# Patient Record
Sex: FEMALE | Race: WHITE | Employment: FULL TIME | URBAN - METROPOLITAN AREA
[De-identification: names, ages, dates, MRNs, and addresses within clinical notes are randomized per-mention and may not be internally consistent; named-entity substitution may affect disease eponyms.]

---

## 2017-03-01 ENCOUNTER — COMMUNICATION - HEALTHEAST (OUTPATIENT)
Dept: INTERNAL MEDICINE | Facility: CLINIC | Age: 32
End: 2017-03-01

## 2017-04-24 ENCOUNTER — OFFICE VISIT - HEALTHEAST (OUTPATIENT)
Dept: INTERNAL MEDICINE | Facility: CLINIC | Age: 32
End: 2017-04-24

## 2017-04-24 DIAGNOSIS — N75.0 INFECTED CYST OF BARTHOLIN'S GLAND DUCT: ICD-10-CM

## 2017-04-24 DIAGNOSIS — D64.9 ANEMIA, UNSPECIFIED: ICD-10-CM

## 2017-04-24 DIAGNOSIS — E55.9 VITAMIN D DEFICIENCY: ICD-10-CM

## 2017-04-24 DIAGNOSIS — Z00.00 HEALTH CARE MAINTENANCE: ICD-10-CM

## 2017-04-24 LAB
CHOLEST SERPL-MCNC: 133 MG/DL
FASTING STATUS PATIENT QL REPORTED: YES
HDLC SERPL-MCNC: 41 MG/DL
LDLC SERPL CALC-MCNC: 78 MG/DL
TRIGL SERPL-MCNC: 70 MG/DL

## 2017-04-24 ASSESSMENT — MIFFLIN-ST. JEOR: SCORE: 1208.65

## 2017-04-27 ENCOUNTER — COMMUNICATION - HEALTHEAST (OUTPATIENT)
Dept: INTERNAL MEDICINE | Facility: CLINIC | Age: 32
End: 2017-04-27

## 2017-04-28 LAB
HPV INTERPRETATION - HISTORICAL: ABNORMAL
HPV INTERPRETER - HISTORICAL: ABNORMAL

## 2017-05-01 ENCOUNTER — COMMUNICATION - HEALTHEAST (OUTPATIENT)
Dept: INTERNAL MEDICINE | Facility: CLINIC | Age: 32
End: 2017-05-01

## 2017-05-01 DIAGNOSIS — R87.610 ATYPICAL SQUAMOUS CELLS OF UNDETERMINED SIGNIFICANCE ON CYTOLOGIC SMEAR OF CERVIX (ASC-US): ICD-10-CM

## 2017-05-01 LAB
BKR LAB AP ABNORMAL BLEEDING: NO
BKR LAB AP BIRTH CONTROL/HORMONES: ABNORMAL
BKR LAB AP CERVICAL APPEARANCE: NORMAL
BKR LAB AP GYN ADEQUACY: ABNORMAL
BKR LAB AP GYN INTERPRETATION: ABNORMAL
BKR LAB AP HPV REFLEX: ABNORMAL
BKR LAB AP LMP: ABNORMAL
BKR LAB AP PATIENT STATUS: ABNORMAL
BKR LAB AP PREVIOUS ABNORMAL: NO
BKR LAB AP PREVIOUS NORMAL: ABNORMAL
HIGH RISK?: NO
PATH REPORT.COMMENTS IMP SPEC: ABNORMAL
RESULT FLAG (HE HISTORICAL CONVERSION): ABNORMAL

## 2017-05-04 ENCOUNTER — RECORDS - HEALTHEAST (OUTPATIENT)
Dept: ADMINISTRATIVE | Facility: OTHER | Age: 32
End: 2017-05-04

## 2018-01-19 ENCOUNTER — COMMUNICATION - HEALTHEAST (OUTPATIENT)
Dept: INTERNAL MEDICINE | Facility: CLINIC | Age: 33
End: 2018-01-19

## 2018-01-19 DIAGNOSIS — F32.A DEPRESSION: ICD-10-CM

## 2020-04-28 ENCOUNTER — COMMUNICATION - HEALTHEAST (OUTPATIENT)
Dept: SCHEDULING | Facility: CLINIC | Age: 35
End: 2020-04-28

## 2021-01-04 ENCOUNTER — OFFICE VISIT - HEALTHEAST (OUTPATIENT)
Dept: FAMILY MEDICINE | Facility: CLINIC | Age: 36
End: 2021-01-04

## 2021-01-04 ENCOUNTER — COMMUNICATION - HEALTHEAST (OUTPATIENT)
Dept: SCHEDULING | Facility: CLINIC | Age: 36
End: 2021-01-04

## 2021-01-04 DIAGNOSIS — N90.7 SEBACEOUS CYST OF LABIA: ICD-10-CM

## 2021-01-04 RX ORDER — FLUOCINONIDE TOPICAL SOLUTION USP, 0.05% 0.5 MG/ML
1 SOLUTION TOPICAL PRN
Status: SHIPPED | COMMUNITY
Start: 2020-12-01

## 2021-01-04 RX ORDER — KETOCONAZOLE 20 MG/ML
1 SHAMPOO TOPICAL PRN
Status: SHIPPED | COMMUNITY
Start: 2020-12-29

## 2021-01-04 ASSESSMENT — MIFFLIN-ST. JEOR: SCORE: 1246.3

## 2021-01-04 NOTE — ASSESSMENT & PLAN NOTE
Already draining, with recurrence though will start on antibiotics as per orders.  If it does recur within the next 3 months then consider 6 to 12-month therapy of doxycycline daily.

## 2021-01-06 ENCOUNTER — RECORDS - HEALTHEAST (OUTPATIENT)
Dept: ADMINISTRATIVE | Facility: OTHER | Age: 36
End: 2021-01-06

## 2021-01-06 ENCOUNTER — COMMUNICATION - HEALTHEAST (OUTPATIENT)
Dept: ADMINISTRATIVE | Facility: CLINIC | Age: 36
End: 2021-01-06

## 2021-01-06 DIAGNOSIS — N90.7 SEBACEOUS CYST OF LABIA: ICD-10-CM

## 2021-03-03 ENCOUNTER — ANESTHESIA - HEALTHEAST (OUTPATIENT)
Dept: SURGERY | Facility: AMBULATORY SURGERY CENTER | Age: 36
End: 2021-03-03

## 2021-03-03 ASSESSMENT — MIFFLIN-ST. JEOR: SCORE: 1257.42

## 2021-03-04 ENCOUNTER — SURGERY - HEALTHEAST (OUTPATIENT)
Dept: SURGERY | Facility: AMBULATORY SURGERY CENTER | Age: 36
End: 2021-03-04

## 2021-03-04 ASSESSMENT — MIFFLIN-ST. JEOR: SCORE: 1257.42

## 2021-04-14 ENCOUNTER — OFFICE VISIT (OUTPATIENT)
Dept: NURSING | Facility: CLINIC | Age: 36
End: 2021-04-14
Payer: COMMERCIAL

## 2021-04-14 PROCEDURE — 0001A PR COVID VAC PFIZER DIL RECON 30 MCG/0.3 ML IM: CPT

## 2021-04-14 PROCEDURE — 91300 PR COVID VAC PFIZER DIL RECON 30 MCG/0.3 ML IM: CPT

## 2021-05-01 ENCOUNTER — HEALTH MAINTENANCE LETTER (OUTPATIENT)
Age: 36
End: 2021-05-01

## 2021-05-05 ENCOUNTER — IMMUNIZATION (OUTPATIENT)
Dept: NURSING | Facility: CLINIC | Age: 36
End: 2021-05-05
Attending: INTERNAL MEDICINE
Payer: COMMERCIAL

## 2021-05-05 PROCEDURE — 0002A PR COVID VAC PFIZER DIL RECON 30 MCG/0.3 ML IM: CPT

## 2021-05-05 PROCEDURE — 91300 PR COVID VAC PFIZER DIL RECON 30 MCG/0.3 ML IM: CPT

## 2021-05-30 VITALS — HEIGHT: 61 IN | BODY MASS INDEX: 24.73 KG/M2 | WEIGHT: 131 LBS

## 2021-06-05 VITALS
WEIGHT: 139.3 LBS | HEIGHT: 61 IN | TEMPERATURE: 99.4 F | SYSTOLIC BLOOD PRESSURE: 124 MMHG | RESPIRATION RATE: 12 BRPM | HEART RATE: 76 BPM | DIASTOLIC BLOOD PRESSURE: 64 MMHG | BODY MASS INDEX: 26.3 KG/M2

## 2021-06-05 VITALS — WEIGHT: 140 LBS | BODY MASS INDEX: 26.43 KG/M2 | HEIGHT: 61 IN

## 2021-06-07 NOTE — TELEPHONE ENCOUNTER
"\"Feels like I have chest tightness\"    Left side chest tightness she reports for 4 weeks.    Left arm pain., at times. But I do work out  I dont feel stressed out., but when I do feel stressed It comes.    No shortness of breath.  No cough.  No fever   Patient advised to go to the ER . She states she will go to [facility] today. Closer to her home.    States not exposed to COVID 19. Or any known cases  Annel Bennett RN  [facility] Triage/refill nurse        Reason for Disposition    Chest pain lasting longer than 5 minutes    Intermittent chest pains persist > 3 days    Patient wants to be seen    Intermittent mild chest pain lasting a few seconds each time    Protocols used: CHEST PAIN-A-OH      "

## 2021-06-10 NOTE — PROGRESS NOTES
"Chief complaint: Here for a physical    History of present illness:   Francisca comes in today originally for a swelling in the inner labia.  She also would like to change to our clinic as she lives and works closer here.  She noticed the swelling about a week ago and it got worse and 2 days ago was quite severe, today is feeling a little better.  He denies any other concerns.    Social history:   Social History     Social History Narrative    She is a  and works in [employer].  She is , does not have children, and lives with her parents.  She enjoys biking and reading.       Family history:    Family History   Problem Relation Age of Onset     Other Father      encephalitis     Dementia Father        Review of systems: See above.  The rest of the review of systems are negative for all systems.    Current allergies, medications, and problem list are all reviewed and updated in the chart.    Physical exam:  Vitals:    04/24/17 0817   BP: 98/62   Pulse: (!) 55   Weight: 131 lb (59.4 kg)   Height: 5' 0.5\" (1.537 m)     Body mass index is 25.16 kg/(m^2).  General Appearance:  Alert, cooperative, no distress, appears stated age   Head:  Normocephalic, without obvious abnormality, atraumatic   Eyes:  PERRL, conjunctiva/corneas clear, EOM's intact   Ears:  Normal TM's and external ear canals, both ears   Nose: Nares normal, no drainage    Throat: Lips, mucosa, and tongue normal; teeth and gums normal   Neck: Supple, symmetrical, trachea midline, no adenopathy;  thyroid: not enlarged, symmetric, no tenderness/mass/nodules; no carotid bruit   Back:   Symmetric, no curvature, ROM normal   Lungs:   Clear to auscultation bilaterally, respirations unlabored   Breasts:  No breast masses, tenderness, asymmetry, or nipple discharge.   Heart:  Regular rate and rhythm, S1 and S2 normal, no murmur, rub, or gallop   Abdomen:   Soft, non-tender, bowel sounds active, no masses, no organomegaly   Genitalia: Normally " developed genitalia with no external lesions or eruptions.  Vagina and cervix show swelling at the left lower introitus with tenderness and induration, no cystocele.  Uterus normal size and position.  No adnexal mass or tenderness.   Rectal:  No hemorrhoids   Extremities: Extremities normal, atraumatic, no cyanosis or edema   Skin: Skin color, texture, turgor normal, no rashes or lesions   Lymph nodes: Cervical, supraclavicular, and axillary nodes normal   Neurologic: Nonfocal with facial symmetry      Assessment and plan:  1. Health care maintenance  She had an abnormal Pap smear 3 years ago and did not have follow-up.  If this 1 is abnormal with positive HPV we will send her on for a colposcopy.  - Gynecologic Cytology (PAP Smear)  - Lipid Cascade    2. Anemia  Check labs, and the past was related to menstrual cycles and she now has a Mirena IUD.  - Basic Metabolic Panel  - HM2(CBC w/o Differential)  - Thyroid Stimulating Hormone (TSH)    3. Vitamin D deficiency  Is not currently taking supplements.  - Vitamin D, Total (25-Hydroxy)    4. Infected cyst of Bartholin's gland duct  We will start her on Septra twice daily for 7 days and she will take a bath daily for 30 minutes.  If her symptoms are not improving or resolved within the next 2-3 days would recommend she have this I&D by OB/GYN.          Paulette Real MD  Internal and Geriatric Medicine  [facility]  4/24/2017    [facility]  Much or all of the text in this note was generated through the use of Dragon Dictate voice-to-text software. Errors in spelling or words which seem out of context are unintentional.  Sound alike errors, in particular, may have escaped editing.

## 2021-06-14 NOTE — TELEPHONE ENCOUNTER
Reason for call:  Patient reporting a symptom of pain    Symptom or request: pain in groin    Duration (how long have symptoms been present): pt was seen on 1/4/21 for pain and was put on medication. Pt did have some of the side effects that Dr. Norton discussed with her but the pain has not decreased at all.    Have you been treated for this before? Yes    Additional comments: Patient is very uncomfortable with the pain in the groin area. She is taking the medication like she was instructed but the pain is not decreasing at all.    Phone Number patient can be reached at:    Cell number on file:    Telephone Information:   Mobile [phone]       Best Time:  anytime    Can we leave a detailed message on this number: Yes    Call taken on 1/6/2021 at 8:42 AM by Meron Jensen
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
## 2021-06-14 NOTE — TELEPHONE ENCOUNTER
Given pain and expected improvement. Discussed taking tylenol and advil and will get urgent Gyn consult as it most likely is going to require draining.

## 2021-06-15 NOTE — ANESTHESIA CARE TRANSFER NOTE
Last vitals:   Vitals:    03/04/21 0835   BP: (P) 96/52   Pulse: (P) 91   Resp: (P) 16   Temp: (P) 36.1  C (97  F)   SpO2: (P) 99%     Pt brought to phase 2 on 6L O2. Monitors applied. VSS.    Patient's level of consciousness is drowsy  Spontaneous respirations: yes  Maintains airway independently: yes  Dentition unchanged: yes  Oropharynx: oropharynx clear of all foreign objects    QCDR Measures:  ASA# 20 - Surgical Safety Checklist: WHO surgical safety checklist completed prior to induction    PQRS# 430 - Adult PONV Prevention: 4558F - Pt received => 2 anti-emetic agents (different classes) preop & intraop  ASA# 8 - Peds PONV Prevention: NA - Not pediatric patient, not GA or 2 or more risk factors NOT present  PQRS# 424 - Elizabeth-op Temp Management: 4559F - At least one body temp DOCUMENTED => 35.5C or 95.9F within required timeframe  PQRS# 426 - PACU Transfer Protocol: - Transfer of care checklist used  ASA# 14 - Acute Post-op Pain: ASA14B - Patient did NOT experience pain >= 7 out of 10

## 2021-06-15 NOTE — ANESTHESIA PREPROCEDURE EVALUATION
Anesthesia Evaluation      No history of anesthetic complications     Airway   Mallampati: I  Neck ROM: full   Pulmonary     breath sounds clear to auscultation  (-) asthma                         Cardiovascular   Exercise tolerance: > or = 10 METS  (-) angina  Rhythm: regular  Rate: normal,         Neuro/Psych    (-) no seizures    Endo/Other    (-) no diabetes     GI/Hepatic/Renal    (-) renal disease          Dental                         Anesthesia Plan  Planned anesthetic: MAC    ASA 1     Anesthetic plan and risks discussed with: patient  Anesthesia plan special considerations: antiemetics,   Post-op plan: routine recovery

## 2021-06-15 NOTE — ANESTHESIA POSTPROCEDURE EVALUATION
Patient: Francisca Rojo  Procedure(s):  EXCISION LEFT BARTHOLIN CYST  Anesthesia type: MAC    Patient location: Phase II Recovery  Last vitals:   Vitals Value Taken Time   /72 03/04/21 1000   Temp 36.1  C (97  F) 03/04/21 0835   Pulse 60 03/04/21 1002   Resp 16 03/04/21 0843   SpO2 99 % 03/04/21 1002   Vitals shown include unvalidated device data.  Post vital signs: stable  Level of consciousness: awake and responds to simple questions  Post-anesthesia pain: pain controlled  Post-anesthesia nausea and vomiting: no  Pulmonary: unassisted, return to baseline  Cardiovascular: stable and blood pressure at baseline  Hydration: adequate  Anesthetic events: no    QCDR Measures:  ASA# 11 - Elizabeth-op Cardiac Arrest: ASA11B - Patient did NOT experience unanticipated cardiac arrest  ASA# 12 - Elizabeth-op Mortality Rate: ASA12B - Patient did NOT die  ASA# 13 - PACU Re-Intubation Rate: NA - No ETT / LMA used for case  ASA# 10 - Composite Anes Safety: ASA10A - No serious adverse event    Additional Notes:

## 2021-06-16 PROBLEM — N90.7 SEBACEOUS CYST OF LABIA: Status: ACTIVE | Noted: 2021-01-04

## 2021-06-30 NOTE — PROGRESS NOTES
"Progress Notes by Jayy Norton DO at 1/4/2021  3:20 PM     Author: Jayy Norton DO Service: -- Author Type: Physician    Filed: 1/4/2021  6:54 PM Encounter Date: 1/4/2021 Status: Signed    : Jayy Norton DO (Physician)         Assessment & Plan   Problem List Items Addressed This Visit     Sebaceous cyst of labia     Already draining, with recurrence though will start on antibiotics as per orders.  If it does recur within the next 3 months then consider 6 to 12-month therapy of doxycycline daily.         Relevant Medications    fluocinonide (LIDEX) 0.05 % external solution    doxycycline (VIBRA-TABS) 100 MG tablet             BMI:   Estimated body mass index is 26.76 kg/m  as calculated from the following:    Height as of this encounter: 5' 0.5\" (1.537 m).    Weight as of this encounter: 139 lb 4.8 oz (63.2 kg).   The following high BMI interventions were performed this visit: encouragement to exercise        Return in about 6 months (around 7/4/2021) for Annual physical.    Jayy Norton DO  [facility]     Francisca Rojo is 35 y.o. and presents to clinic today for the following health issues   35 y.o. female presents for pain around left labia with possible cyst.  Patient had similar quite a few years ago.  Started developing about a week ago but previously they drained on their own.  This 1 that was not draining and now causing excessive discomfort.  Sitting makes worse than any pressure around her labia makes worse.  No vaginal discharge.  No change in undergarments, or any recent procedures.  She just completed a full series of electrolysis for hair removal approximately 4 to 6 months ago.  She has not used any creams or shave the area.  In October she did have a Pap smear and also replacement of her IUD.       Review of Systems   Constitutional: Negative for chills, fatigue and fever.   Genitourinary: " "Negative for dysuria, frequency, menstrual problem, pelvic pain, urgency and vaginal discharge.           Objective    /64 (Patient Site: Left Arm, Patient Position: Sitting, Cuff Size: Adult Large)   Pulse 76   Temp 99.4  F (37.4  C) (Oral)   Resp 12   Ht 5' 0.5\" (1.537 m)   Wt 139 lb 4.8 oz (63.2 kg)   LMP  (LMP Unknown)   Breastfeeding No   BMI 26.76 kg/m    Body mass index is 26.76 kg/m .  Physical Exam   Constitutional: She is oriented to person, place, and time. She appears well-developed and well-nourished. No distress.   HENT:   Head: Normocephalic and atraumatic.   Pulmonary/Chest: Effort normal and breath sounds normal.   Genitourinary:       There is no rash, tenderness or lesion on the right labia. There is tenderness and lesion on the left labia. There is no rash on the left labia.    No vaginal discharge.     Musculoskeletal:         General: No edema.   Neurological: She is alert and oriented to person, place, and time.   Psychiatric: She has a normal mood and affect. Her behavior is normal.                    "

## 2021-07-07 ENCOUNTER — TRANSFERRED RECORDS (OUTPATIENT)
Dept: MULTI SPECIALTY CLINIC | Facility: CLINIC | Age: 36
End: 2021-07-07

## 2021-07-07 LAB
PAP SMEAR - HIM PATIENT REPORTED: NEGATIVE
PAP-ABSTRACT: NORMAL

## 2021-09-29 ENCOUNTER — TRANSFERRED RECORDS (OUTPATIENT)
Dept: HEALTH INFORMATION MANAGEMENT | Facility: CLINIC | Age: 36
End: 2021-09-29

## 2021-10-16 ENCOUNTER — HEALTH MAINTENANCE LETTER (OUTPATIENT)
Age: 36
End: 2021-10-16

## 2022-05-22 ENCOUNTER — HEALTH MAINTENANCE LETTER (OUTPATIENT)
Age: 37
End: 2022-05-22

## 2022-09-25 ENCOUNTER — HEALTH MAINTENANCE LETTER (OUTPATIENT)
Age: 37
End: 2022-09-25

## 2023-01-05 ENCOUNTER — LAB REQUISITION (OUTPATIENT)
Dept: LAB | Facility: CLINIC | Age: 38
End: 2023-01-05
Payer: COMMERCIAL

## 2023-01-05 DIAGNOSIS — N89.8 OTHER SPECIFIED NONINFLAMMATORY DISORDERS OF VAGINA: ICD-10-CM

## 2023-01-05 PROCEDURE — 87491 CHLMYD TRACH DNA AMP PROBE: CPT | Mod: ORL | Performed by: OBSTETRICS & GYNECOLOGY

## 2023-01-06 LAB
C TRACH DNA SPEC QL PROBE+SIG AMP: NEGATIVE
N GONORRHOEA DNA SPEC QL NAA+PROBE: NEGATIVE

## 2023-03-29 ENCOUNTER — OFFICE VISIT (OUTPATIENT)
Dept: FAMILY MEDICINE | Facility: CLINIC | Age: 38
End: 2023-03-29
Payer: COMMERCIAL

## 2023-03-29 VITALS
DIASTOLIC BLOOD PRESSURE: 69 MMHG | HEART RATE: 70 BPM | OXYGEN SATURATION: 98 % | RESPIRATION RATE: 16 BRPM | TEMPERATURE: 97.8 F | WEIGHT: 123.3 LBS | HEIGHT: 61 IN | BODY MASS INDEX: 23.28 KG/M2 | SYSTOLIC BLOOD PRESSURE: 100 MMHG

## 2023-03-29 DIAGNOSIS — Z83.3 FAMILY HISTORY OF DIABETES MELLITUS: ICD-10-CM

## 2023-03-29 DIAGNOSIS — Z00.00 ANNUAL PHYSICAL EXAM: Primary | ICD-10-CM

## 2023-03-29 DIAGNOSIS — E55.9 VITAMIN D DEFICIENCY: ICD-10-CM

## 2023-03-29 DIAGNOSIS — F32.A DEPRESSION, UNSPECIFIED DEPRESSION TYPE: ICD-10-CM

## 2023-03-29 LAB
ALBUMIN SERPL BCG-MCNC: 4.1 G/DL (ref 3.5–5.2)
ALP SERPL-CCNC: 79 U/L (ref 35–104)
ALT SERPL W P-5'-P-CCNC: 21 U/L (ref 10–35)
ANION GAP SERPL CALCULATED.3IONS-SCNC: 11 MMOL/L (ref 7–15)
AST SERPL W P-5'-P-CCNC: 19 U/L (ref 10–35)
BASOPHILS # BLD AUTO: 0 10E3/UL (ref 0–0.2)
BASOPHILS NFR BLD AUTO: 0 %
BILIRUB SERPL-MCNC: 0.4 MG/DL
BUN SERPL-MCNC: 9.4 MG/DL (ref 6–20)
CALCIUM SERPL-MCNC: 8.9 MG/DL (ref 8.6–10)
CHLORIDE SERPL-SCNC: 105 MMOL/L (ref 98–107)
CHOLEST SERPL-MCNC: 140 MG/DL
CREAT SERPL-MCNC: 0.64 MG/DL (ref 0.51–0.95)
DEPRECATED HCO3 PLAS-SCNC: 25 MMOL/L (ref 22–29)
EOSINOPHIL # BLD AUTO: 0 10E3/UL (ref 0–0.7)
EOSINOPHIL NFR BLD AUTO: 0 %
ERYTHROCYTE [DISTWIDTH] IN BLOOD BY AUTOMATED COUNT: 12.8 % (ref 10–15)
GFR SERPL CREATININE-BSD FRML MDRD: >90 ML/MIN/1.73M2
GLUCOSE SERPL-MCNC: 88 MG/DL (ref 70–99)
HBA1C MFR BLD: 5.4 % (ref 0–5.6)
HCT VFR BLD AUTO: 44.8 % (ref 35–47)
HDLC SERPL-MCNC: 52 MG/DL
HGB BLD-MCNC: 14.7 G/DL (ref 11.7–15.7)
IMM GRANULOCYTES # BLD: 0 10E3/UL
IMM GRANULOCYTES NFR BLD: 0 %
LDLC SERPL CALC-MCNC: 76 MG/DL
LYMPHOCYTES # BLD AUTO: 1.7 10E3/UL (ref 0.8–5.3)
LYMPHOCYTES NFR BLD AUTO: 20 %
MCH RBC QN AUTO: 31.9 PG (ref 26.5–33)
MCHC RBC AUTO-ENTMCNC: 32.8 G/DL (ref 31.5–36.5)
MCV RBC AUTO: 97 FL (ref 78–100)
MONOCYTES # BLD AUTO: 0.6 10E3/UL (ref 0–1.3)
MONOCYTES NFR BLD AUTO: 7 %
NEUTROPHILS # BLD AUTO: 6.2 10E3/UL (ref 1.6–8.3)
NEUTROPHILS NFR BLD AUTO: 73 %
NONHDLC SERPL-MCNC: 88 MG/DL
PLATELET # BLD AUTO: 212 10E3/UL (ref 150–450)
POTASSIUM SERPL-SCNC: 3.8 MMOL/L (ref 3.4–5.3)
PROT SERPL-MCNC: 7.4 G/DL (ref 6.4–8.3)
RBC # BLD AUTO: 4.61 10E6/UL (ref 3.8–5.2)
SODIUM SERPL-SCNC: 141 MMOL/L (ref 136–145)
TRIGL SERPL-MCNC: 58 MG/DL
TSH SERPL DL<=0.005 MIU/L-ACNC: 1.17 UIU/ML (ref 0.3–4.2)
WBC # BLD AUTO: 8.5 10E3/UL (ref 4–11)

## 2023-03-29 PROCEDURE — 82306 VITAMIN D 25 HYDROXY: CPT | Performed by: PHYSICIAN ASSISTANT

## 2023-03-29 PROCEDURE — 80061 LIPID PANEL: CPT | Performed by: PHYSICIAN ASSISTANT

## 2023-03-29 PROCEDURE — 80050 GENERAL HEALTH PANEL: CPT | Performed by: PHYSICIAN ASSISTANT

## 2023-03-29 PROCEDURE — 83036 HEMOGLOBIN GLYCOSYLATED A1C: CPT | Performed by: PHYSICIAN ASSISTANT

## 2023-03-29 PROCEDURE — 36415 COLL VENOUS BLD VENIPUNCTURE: CPT | Performed by: PHYSICIAN ASSISTANT

## 2023-03-29 PROCEDURE — 99385 PREV VISIT NEW AGE 18-39: CPT | Performed by: PHYSICIAN ASSISTANT

## 2023-03-29 RX ORDER — ESCITALOPRAM OXALATE 10 MG/1
10 TABLET ORAL DAILY
Qty: 30 TABLET | Refills: 2 | Status: SHIPPED | OUTPATIENT
Start: 2023-03-29 | End: 2023-07-19

## 2023-03-29 ASSESSMENT — ENCOUNTER SYMPTOMS
ARTHRALGIAS: 0
CHILLS: 0
COUGH: 0
JOINT SWELLING: 0
PALPITATIONS: 1
BREAST MASS: 0
DIARRHEA: 0
SORE THROAT: 0
HEARTBURN: 0
WEAKNESS: 0
NAUSEA: 0
HEMATOCHEZIA: 0
FEVER: 0
EYE PAIN: 0
NERVOUS/ANXIOUS: 1
SHORTNESS OF BREATH: 0
CONSTIPATION: 0
FREQUENCY: 0
DYSURIA: 0
PARESTHESIAS: 0
MYALGIAS: 0
HEADACHES: 0
HEMATURIA: 0
DIZZINESS: 0
ABDOMINAL PAIN: 0

## 2023-03-29 ASSESSMENT — PAIN SCALES - GENERAL: PAINLEVEL: NO PAIN (0)

## 2023-03-29 NOTE — PROGRESS NOTES
SUBJECTIVE:   CC: Tess is a very pleasant 37 year old who presents for preventive health visit.     Here today for annual physical as well as evaluation of depression.     Ongoing concern for the past few months for the patient.  She always wondered if she had baseline depressive symptoms, has noted increased in these as well as anxiety since ending a 2-year relationship a few months ago.  Has great support system with family and friends.  Established care with a therapist over the last 2 months which has been a great support for her.  Was recommended by the therapist to establish care with a PCP and discuss medication for depression.  She denies SI/HI.  Notes fluctuations in mood.  Poor sleep.  Decreased appetite.  Did need to take a leave of absence from work due to this.  No prior mental health history.  No current medications she is taking daily.    Recently restarted a exercise routine.  Diet well-balanced.  Sees OB/GYN (up-to-date on Pap), eye doctor (history retinal detachment), dentist and dermatologist.      Patient has been advised of split billing requirements and indicates understanding: Yes  Healthy Habits:     Getting at least 3 servings of Calcium per day:  NO    Bi-annual eye exam:  Yes    Dental care twice a year:  Yes    Sleep apnea or symptoms of sleep apnea:  None    Diet:  Regular (no restrictions)    Frequency of exercise:  None    Taking medications regularly:  Yes    Medication side effects:  Not applicable    PHQ-2 Total Score: 2    Additional concerns today:  Yes      Today's PHQ-2 Score:   PHQ-2 ( 1999 [employer]) 3/29/2023   Q1: Little interest or pleasure in doing things 1   Q2: Feeling down, depressed or hopeless 1   PHQ-2 Score 2   Q1: Little interest or pleasure in doing things Several days   Q2: Feeling down, depressed or hopeless Several days   PHQ-2 Score 2       Have you ever done Advance Care Planning? (For example, a Health Directive, POLST, or a discussion with a medical  provider or your loved ones about your wishes): No, advance care planning information given to patient to review.  Patient plans to discuss their wishes with loved ones or provider.      Social History     Tobacco Use     Smoking status: Never     Smokeless tobacco: Never   Substance Use Topics     Alcohol use: Yes     Comment: 1 drink every 3 months         Alcohol Use 3/29/2023   Prescreen: >3 drinks/day or >7 drinks/week? No     Reviewed orders with patient.  Reviewed health maintenance and updated orders accordingly - Yes  Lab work is in process  Labs reviewed in EPIC  BP Readings from Last 3 Encounters:   03/29/23 100/69   01/04/21 124/64    Wt Readings from Last 3 Encounters:   03/29/23 55.9 kg (123 lb 4.8 oz)   03/04/21 63.5 kg (140 lb)   01/04/21 63.2 kg (139 lb 4.8 oz)              Patient Active Problem List   Diagnosis     Anemia     Vitamin D deficiency     Abnormal Pap Smear Of Cervix     Sebaceous cyst of labia     Past Surgical History:   Procedure Laterality Date     EYE SURGERY Right 2005    Buckle     EYE SURGERY Left 2006    Retina     SD MARSUP BARTHOLIN GLAND CYST N/A 3/4/2021    Procedure: EXCISION LEFT BARTHOLIN CYST;  Surgeon: Lexie Victor DO;  Location: [facility];  Service: Gynecology     WISDOM TOOTH EXTRACTION         Social History     Tobacco Use     Smoking status: Never     Smokeless tobacco: Never   Substance Use Topics     Alcohol use: Yes     Comment: 1 drink every 3 months     Family History   Problem Relation Age of Onset     Diabetes Mother      Osteoporosis Mother      No Known Problems Father      No Known Problems Sister      Hypertension Maternal Grandmother      Diabetes Maternal Grandmother      Cerebrovascular Disease Maternal Grandfather      No Known Problems Paternal Grandmother      No Known Problems Paternal Grandfather      No Known Problems Sister          Current Outpatient Medications   Medication Sig Dispense Refill     escitalopram (LEXAPRO) 10  MG tablet Take 1 tablet (10 mg) by mouth daily 30 tablet 2     fluocinonide (LIDEX) 0.05 % external solution [FLUOCINONIDE (LIDEX) 0.05 % EXTERNAL SOLUTION] Apply 1 application topically as needed.       ketoconazole (NIZORAL) 2 % shampoo [KETOCONAZOLE (NIZORAL) 2 % SHAMPOO] Apply 1 application topically as needed.       levonorgestrel (LILETTA UTRN) [LEVONORGESTREL (LILETTA UTRN)] 1 each by Intrauterine route.       No Known Allergies  Recent Labs   Lab Test 03/29/23  1225 01/06/21  1551 04/28/20  1318 04/24/17  0857   A1C 5.4  --   --   --    LDL  --   --   --  78   HDL  --   --   --  41*   TRIG  --   --   --  70   CR  --  0.79 0.70  --    GFRESTIMATED  --  >60 >60  --    GFRESTBLACK  --  >60 >60  --    POTASSIUM  --  3.4* 3.8  --         Breast Cancer Screening:    Breast CA Risk Assessment (FHS-7) 3/29/2023   Do you have a family history of breast, colon, or ovarian cancer? No / Unknown       History of abnormal Pap smear:   PAP / HPV 4/24/2017   PAP Atypical squamous cells of undetermined significance  Electronically signed by Boston Jama MD on 5/1/2017 at  1:15 PM       Reviewed and updated as needed this visit by clinical staff   Tobacco  Allergies  Meds   Med Hx  Surg Hx  Fam Hx          Reviewed and updated as needed this visit by Provider   Tobacco  Allergies  Meds   Med Hx  Surg Hx  Fam Hx         Past Medical History:   Diagnosis Date     Anemia      Shingles      Vitamin D deficiency       Past Surgical History:   Procedure Laterality Date     EYE SURGERY Right 2005    Buckle     EYE SURGERY Left 2006    Retina     GA MARSUP BARTHOLIN GLAND CYST N/A 3/4/2021    Procedure: EXCISION LEFT BARTHOLIN CYST;  Surgeon: Lexie Victor DO;  Location: [facility];  Service: Gynecology     WISDOM TOOTH EXTRACTION         Review of Systems  CONSTITUTIONAL: NEGATIVE for fever, chills, change in weight  INTEGUMENTARU/SKIN: NEGATIVE for worrisome rashes, moles or lesions  EYES:  "NEGATIVE for vision changes or irritation  ENT: NEGATIVE for ear, mouth and throat problems  RESP: NEGATIVE for significant cough or SOB  BREAST: NEGATIVE for masses, tenderness or discharge  CV: NEGATIVE for chest pain, palpitations or peripheral edema  GI: NEGATIVE for nausea, abdominal pain, heartburn, or change in bowel habits  : NEGATIVE for unusual urinary or vaginal symptoms. Periods are regular.  MUSCULOSKELETAL: NEGATIVE for significant arthralgias or myalgia  NEURO: NEGATIVE for weakness, dizziness or paresthesias  PSYCHIATRIC: +Depression/anxiety. Denies SI/HI.      OBJECTIVE:   /69 (BP Location: Right arm, Patient Position: Sitting, Cuff Size: Adult Regular)   Pulse 70   Temp 97.8  F (36.6  C) (Tympanic)   Resp 16   Ht 1.549 m (5' 1\")   Wt 55.9 kg (123 lb 4.8 oz)   SpO2 98%   BMI 23.30 kg/m    Physical Exam  GENERAL: healthy, alert and no distress  EYES: Eyes grossly normal to inspection, PERRL and conjunctivae and sclerae normal  HENT: ear canals and TM's normal, nose and mouth without ulcers or lesions  NECK: no adenopathy, no asymmetry, masses, or scars and thyroid normal to palpation  RESP: lungs clear to auscultation - no rales, rhonchi or wheezes  CV: regular rate and rhythm, normal S1 S2, no S3 or S4, no murmur, click or rub, no peripheral edema and peripheral pulses strong  ABDOMEN: soft, nontender, no hepatosplenomegaly, no masses and bowel sounds normal  MS: no gross musculoskeletal defects noted, no edema  SKIN: no suspicious lesions or rashes  NEURO: Normal strength and tone, mentation intact and speech normal  PSYCH: mentation appears normal, affect normal/bright      ASSESSMENT/PLAN:   Francisca was seen today for physical.    Diagnoses and all orders for this visit:    Annual physical exam    Annual physical labs today.  We will check vitamin D level today as well given depressive symptoms.  Happy she is established with a therapist and has great support from her family and " friends.  Her therapist also recommended establishing care with a psychiatrist in the future discussed different medication options, we decided on Lexapro 10 mg daily.  Discussed side effects of this medication.  We will plan for follow-up in 1 month.  Sooner if needed.  Continue close follow-up with her therapist.  Healthy diet, exercise and good sleep are all important for her mental and physical health.     -     CBC with platelets and differential; Future  -     Comprehensive metabolic panel (BMP + Alb, Alk Phos, ALT, AST, Total. Bili, TP); Future  -     TSH with free T4 reflex; Future  -     Vitamin D Deficiency; Future  -     Hemoglobin A1c; Future  -     Lipid panel reflex to direct LDL Fasting; Future    Depression, unspecified depression type    See above.  Plan to follow-up in 1 month.    -     Vitamin D Deficiency; Future  -     escitalopram (LEXAPRO) 10 MG tablet; Take 1 tablet (10 mg) by mouth daily  -     Adult Mental Health  Referral; Future  -     Vitamin D Deficiency    Other orders  -     REVIEW OF HEALTH MAINTENANCE PROTOCOL ORDERS    Patient has been advised of split billing requirements and indicates understanding: Yes    COUNSELING:  Reviewed preventive health counseling, as reflected in patient instructions       Regular exercise       Healthy diet/nutrition       Vision screening    She reports that she has never smoked. She has never used smokeless tobacco.             The likelihood of other entities in the differential is insufficient to justify any further testing for them at this time. This was explained to the patient. The patient was advised that persistent or worsening symptoms would require further evaluation. Patient advised to call the office and if unable to reach to go to the emergency room if they develop any new or worsening symptoms. Expressed understanding and agreement with above stated plan.     Wicho Gray PA-C  [facility]

## 2023-03-30 ENCOUNTER — TELEPHONE (OUTPATIENT)
Dept: FAMILY MEDICINE | Facility: CLINIC | Age: 38
End: 2023-03-30
Payer: COMMERCIAL

## 2023-03-30 LAB — DEPRECATED CALCIDIOL+CALCIFEROL SERPL-MC: 7 UG/L (ref 20–75)

## 2023-03-30 RX ORDER — ERGOCALCIFEROL 1.25 MG/1
50000 CAPSULE, LIQUID FILLED ORAL WEEKLY
Qty: 7 CAPSULE | Refills: 0 | Status: SHIPPED | OUTPATIENT
Start: 2023-03-30 | End: 2023-07-28

## 2023-04-04 ENCOUNTER — OFFICE VISIT (OUTPATIENT)
Dept: FAMILY MEDICINE | Facility: CLINIC | Age: 38
End: 2023-04-04
Payer: COMMERCIAL

## 2023-04-04 ENCOUNTER — MYC MEDICAL ADVICE (OUTPATIENT)
Dept: FAMILY MEDICINE | Facility: CLINIC | Age: 38
End: 2023-04-04

## 2023-04-04 VITALS
WEIGHT: 123.9 LBS | SYSTOLIC BLOOD PRESSURE: 103 MMHG | TEMPERATURE: 97.7 F | HEART RATE: 71 BPM | OXYGEN SATURATION: 99 % | DIASTOLIC BLOOD PRESSURE: 67 MMHG | RESPIRATION RATE: 18 BRPM | HEIGHT: 61 IN | BODY MASS INDEX: 23.39 KG/M2

## 2023-04-04 DIAGNOSIS — B02.9 HERPES ZOSTER WITHOUT COMPLICATION: Primary | ICD-10-CM

## 2023-04-04 DIAGNOSIS — F41.9 ANXIETY: ICD-10-CM

## 2023-04-04 DIAGNOSIS — E55.9 VITAMIN D DEFICIENCY: ICD-10-CM

## 2023-04-04 PROCEDURE — 99214 OFFICE O/P EST MOD 30 MIN: CPT | Performed by: PHYSICIAN ASSISTANT

## 2023-04-04 RX ORDER — VALACYCLOVIR HYDROCHLORIDE 1 G/1
1000 TABLET, FILM COATED ORAL 3 TIMES DAILY
Qty: 21 TABLET | Refills: 0 | Status: SHIPPED | OUTPATIENT
Start: 2023-04-04 | End: 2023-04-11

## 2023-04-04 ASSESSMENT — PAIN SCALES - GENERAL: PAINLEVEL: NO PAIN (0)

## 2023-04-04 NOTE — PROGRESS NOTES
"HPI: Tess is a pleasant 38 yo female here with rash on torso x 2d  Denies pain but rash feels itchy  She has been under a great deal of stress  She did see Ravin Gray here who prescribed lexapro last week, but she stopped that after 3 days due to SE (nausea)  She is meeting with her counselor and exercising which helps      Past Medical History:   Diagnosis Date     Anemia      Shingles      Vitamin D deficiency      Past Surgical History:   Procedure Laterality Date     EYE SURGERY Right 2005    [place]     EYE SURGERY Left 2006    Retina     ND MARSUP BARTHOLIN GLAND CYST N/A 3/4/2021    Procedure: EXCISION LEFT BARTHOLIN CYST;  Surgeon: Lexie Victor DO;  Location: [facility];  Service: Gynecology     WISDOM TOOTH EXTRACTION       Social History     Tobacco Use     Smoking status: Never     Smokeless tobacco: Never   Vaping Use     Vaping status: Not on file   Substance Use Topics     Alcohol use: Yes     Comment: 1 drink every 3 months     Current Outpatient Medications   Medication Sig Dispense Refill     escitalopram (LEXAPRO) 10 MG tablet Take 1 tablet (10 mg) by mouth daily 30 tablet 2     fluocinonide (LIDEX) 0.05 % external solution [FLUOCINONIDE (LIDEX) 0.05 % EXTERNAL SOLUTION] Apply 1 application topically as needed.       ketoconazole (NIZORAL) 2 % shampoo [KETOCONAZOLE (NIZORAL) 2 % SHAMPOO] Apply 1 application topically as needed.       levonorgestrel (LILETTA UTRN) [LEVONORGESTREL (LILETTA UTRN)] 1 each by Intrauterine route.       valACYclovir (VALTREX) 1000 mg tablet Take 1 tablet (1,000 mg) by mouth 3 times daily for 7 days 21 tablet 0     vitamin D2 (ERGOCALCIFEROL) 29813 units (1250 mcg) capsule Take 1 capsule (50,000 Units) by mouth once a week 7 capsule 0     No Known Allergies    PHYSICAL EXAM:    /67 (BP Location: Right arm, Patient Position: Sitting, Cuff Size: Adult Regular)   Pulse 71   Temp 97.7  F (36.5  C) (Temporal)   Resp 18   Ht 1.549 m (5' 1\")   Wt " 56.2 kg (123 lb 14.4 oz)   SpO2 99%   BMI 23.41 kg/m      Patient appears non toxic  R T 8 dermatome 2 different clusters of papules on an erythematous base  Psych: approp affect and mood    Assessment and Plan:     (B02.9) Herpes zoster without complication  (primary encounter diagnosis)  Comment:   Plan: valACYclovir (VALTREX) 1000 mg tablet        Tid x 7d    (F41.9) Anxiety  Comment:   Plan: advised pt to restart lexapro once she finishes the valtrex at 5mg at bedtime as had SE from the 10mg. Discussed SE can be self limited and typically these resolve within a few weeks. She is seeing her counselor and advised to exercise a bit more.    (E55.9) Vitamin D deficiency  Comment:   Plan: she is on the once per week vit D for 4 doses. Then should switch to OTC vit D 2000IU/d with food and have vit D level rechecked in 3 months.      Pratibha Hathaway PA-C

## 2023-06-15 ENCOUNTER — TELEPHONE (OUTPATIENT)
Dept: PSYCHIATRY | Facility: CLINIC | Age: 38
End: 2023-06-15
Payer: COMMERCIAL

## 2023-06-15 NOTE — TELEPHONE ENCOUNTER
"PSYCHIATRY CLINIC PHONE INTAKE     SERVICES REQUESTED / INTERESTED IN          Med Management    Presenting Problem and Brief History                              What would you like to be seen for? (brief description):  She recently went through lost of a relationship. Patient is currently seeing therapist and diagnosed with depression. She had SI early on after the end of her relationship, and her therapist recommended medication. Patient tried one medication but didn't go through with it because she didn't like how it made her feel. Her therapist recommended psychiatry and also suggested praying in scheduled intervals (patient is [employer]), yoga and meditation as options. Relationship ended at the beginning of the year and she has been very sad since February. She is unsure what she needs but nothing seems to be helping and she still feels sad no matter what she has been trying.  Have you received a mental health diagnosis? Yes   Which one (s): depression  Is there any history of developmental delay?  No   Are you currently seeing a mental health provider?  Yes            Who / month last seen:  Therapist, Marla, from [facility] in [facility]  Do you have mental health records elsewhere?  Yes  Will you sign a release so we can obtain them?  Yes    Have you ever been hospitalized for psychiatric reasons?  No  Describe:  None    Do you have current thoughts of self-harm?  \"Right now, no.\"    Do you currently have thoughts of harming others?  No    Do you have any safety concerns? No   If yes to these, offer to reach out to a  for follow up.      Substance Use History     Do you have any history of alcohol / illicit drug use?  No  Describe:  none  Have you ever received treatment for this?  No    Describe:  None     Social History     Who is the patient's a guardian?  Yes    Name / number: Self  Have you had an ACT team in last 12 months?  No  Describe: No   OK to leave a detailed voicemail?  " Yes    Would you be interested in learning more about research opportunities for which you or your child may qualify? We can connect you with a team member for more information.  Yes  If yes, send an inbasket message to Pricilla Raya    Medical/ Surgical History                                   Patient Active Problem List   Diagnosis     Anemia     Vitamin D deficiency     Abnormal Pap Smear Of Cervix     Sebaceous cyst of labia          Medications             Have you taken >3 psychiatric medications in your past?  No - only 1   Do you currently take 5 or more medications, including prescriptions, supplements, and other over the counter products?  No    If YES to at least one of these questions:   As part of your evaluation in our clinic, we have specially trained pharmacists as part of your care team. Your provider would like for you to meet with one of our pharmacists to review your current and past medications, ensure your med list is up to date, and queue up any questions or concerns you have about medications. They will review all of your medications, not just for mental health, to help ensure you know what you re taking and that everything is working together.     Please schedule patient in [facility] PSY[facility] (Kylah Lott or Fela Baptiste) for 60m MTM in any green space as virtual (video), telephone, or in person (designated in person days per Epic templates).  -Appt notes can say  Psych eval on xx/xx   -Route telephone encounter to the pharmacist who will be seeing the patient.  If patient has questions about insurance coverage or billing, please still schedule the visit and refer them to call the [facility] coordinators at [phone].    Current Outpatient Medications   Medication Sig Dispense Refill     escitalopram (LEXAPRO) 10 MG tablet Take 1 tablet (10 mg) by mouth daily 30 tablet 2     fluocinonide (LIDEX) 0.05 % external solution [FLUOCINONIDE (LIDEX) 0.05 % EXTERNAL SOLUTION] Apply 1 application  topically as needed.       ketoconazole (NIZORAL) 2 % shampoo [KETOCONAZOLE (NIZORAL) 2 % SHAMPOO] Apply 1 application topically as needed.       levonorgestrel (LILETTA UTRN) [LEVONORGESTREL (LILETTA UTRN)] 1 each by Intrauterine route.       valACYclovir (VALTREX) 1000 mg tablet Take 1 tablet (1,000 mg) by mouth 3 times daily for 7 days 21 tablet 0     vitamin D2 (ERGOCALCIFEROL) 51503 units (1250 mcg) capsule Take 1 capsule (50,000 Units) by mouth once a week 7 capsule 0         DISPOSITION      6/15/23 Intake Phone screen completed. Will call back to schedule.    Vi Tran, .
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
## 2023-07-06 ENCOUNTER — LAB REQUISITION (OUTPATIENT)
Dept: LAB | Facility: CLINIC | Age: 38
End: 2023-07-06
Payer: COMMERCIAL

## 2023-07-06 DIAGNOSIS — Z11.3 ENCOUNTER FOR SCREENING FOR INFECTIONS WITH A PREDOMINANTLY SEXUAL MODE OF TRANSMISSION: ICD-10-CM

## 2023-07-06 PROCEDURE — 87389 HIV-1 AG W/HIV-1&-2 AB AG IA: CPT | Mod: ORL | Performed by: OBSTETRICS & GYNECOLOGY

## 2023-07-06 PROCEDURE — 86780 TREPONEMA PALLIDUM: CPT | Mod: ORL | Performed by: OBSTETRICS & GYNECOLOGY

## 2023-07-06 PROCEDURE — 87491 CHLMYD TRACH DNA AMP PROBE: CPT | Mod: ORL | Performed by: OBSTETRICS & GYNECOLOGY

## 2023-07-06 PROCEDURE — 86803 HEPATITIS C AB TEST: CPT | Mod: ORL | Performed by: OBSTETRICS & GYNECOLOGY

## 2023-07-06 PROCEDURE — 87591 N.GONORRHOEAE DNA AMP PROB: CPT | Mod: ORL | Performed by: OBSTETRICS & GYNECOLOGY

## 2023-07-06 PROCEDURE — 87340 HEPATITIS B SURFACE AG IA: CPT | Mod: ORL | Performed by: OBSTETRICS & GYNECOLOGY

## 2023-07-07 LAB
C TRACH DNA SPEC QL PROBE+SIG AMP: NEGATIVE
HBV SURFACE AG SERPL QL IA: NONREACTIVE
HCV AB SERPL QL IA: NONREACTIVE
HIV 1+2 AB+HIV1 P24 AG SERPL QL IA: NONREACTIVE
N GONORRHOEA DNA SPEC QL NAA+PROBE: NEGATIVE
T PALLIDUM AB SER QL: NONREACTIVE

## 2023-07-13 ASSESSMENT — PATIENT HEALTH QUESTIONNAIRE - PHQ9
SUM OF ALL RESPONSES TO PHQ QUESTIONS 1-9: 2
SUM OF ALL RESPONSES TO PHQ QUESTIONS 1-9: 2
10. IF YOU CHECKED OFF ANY PROBLEMS, HOW DIFFICULT HAVE THESE PROBLEMS MADE IT FOR YOU TO DO YOUR WORK, TAKE CARE OF THINGS AT HOME, OR GET ALONG WITH OTHER PEOPLE: SOMEWHAT DIFFICULT

## 2023-07-14 ENCOUNTER — LAB (OUTPATIENT)
Dept: LAB | Facility: CLINIC | Age: 38
End: 2023-07-14
Attending: STUDENT IN AN ORGANIZED HEALTH CARE EDUCATION/TRAINING PROGRAM
Payer: COMMERCIAL

## 2023-07-14 ENCOUNTER — VIRTUAL VISIT (OUTPATIENT)
Dept: PSYCHIATRY | Facility: CLINIC | Age: 38
End: 2023-07-14
Attending: STUDENT IN AN ORGANIZED HEALTH CARE EDUCATION/TRAINING PROGRAM
Payer: COMMERCIAL

## 2023-07-14 VITALS
HEART RATE: 61 BPM | WEIGHT: 125 LBS | DIASTOLIC BLOOD PRESSURE: 72 MMHG | BODY MASS INDEX: 23.62 KG/M2 | SYSTOLIC BLOOD PRESSURE: 96 MMHG

## 2023-07-14 DIAGNOSIS — E55.9 VITAMIN D DEFICIENCY: ICD-10-CM

## 2023-07-14 DIAGNOSIS — F32.1 MAJOR DEPRESSIVE DISORDER, SINGLE EPISODE, MODERATE (H): Primary | ICD-10-CM

## 2023-07-14 DIAGNOSIS — Z79.899 ENCOUNTER FOR LONG-TERM (CURRENT) USE OF MEDICATIONS: ICD-10-CM

## 2023-07-14 LAB
ALBUMIN SERPL BCG-MCNC: 4.1 G/DL (ref 3.5–5.2)
ALP SERPL-CCNC: 71 U/L (ref 35–104)
ALT SERPL W P-5'-P-CCNC: 21 U/L (ref 0–50)
ANION GAP SERPL CALCULATED.3IONS-SCNC: 9 MMOL/L (ref 7–15)
AST SERPL W P-5'-P-CCNC: 19 U/L (ref 0–45)
BILIRUB SERPL-MCNC: 0.4 MG/DL
BUN SERPL-MCNC: 7.9 MG/DL (ref 6–20)
CALCIUM SERPL-MCNC: 9 MG/DL (ref 8.6–10)
CHLORIDE SERPL-SCNC: 103 MMOL/L (ref 98–107)
CREAT SERPL-MCNC: 0.68 MG/DL (ref 0.51–0.95)
DEPRECATED HCO3 PLAS-SCNC: 26 MMOL/L (ref 22–29)
ERYTHROCYTE [DISTWIDTH] IN BLOOD BY AUTOMATED COUNT: 12.4 % (ref 10–15)
FOLATE SERPL-MCNC: 9.3 NG/ML (ref 4.6–34.8)
GFR SERPL CREATININE-BSD FRML MDRD: >90 ML/MIN/1.73M2
GLUCOSE SERPL-MCNC: 96 MG/DL (ref 70–99)
HCT VFR BLD AUTO: 46.5 % (ref 35–47)
HGB BLD-MCNC: 15.3 G/DL (ref 11.7–15.7)
MCH RBC QN AUTO: 32.6 PG (ref 26.5–33)
MCHC RBC AUTO-ENTMCNC: 32.9 G/DL (ref 31.5–36.5)
MCV RBC AUTO: 99 FL (ref 78–100)
PLATELET # BLD AUTO: 217 10E3/UL (ref 150–450)
POTASSIUM SERPL-SCNC: 4 MMOL/L (ref 3.4–5.3)
PROT SERPL-MCNC: 7.2 G/DL (ref 6.4–8.3)
RBC # BLD AUTO: 4.69 10E6/UL (ref 3.8–5.2)
SODIUM SERPL-SCNC: 138 MMOL/L (ref 136–145)
TSH SERPL DL<=0.005 MIU/L-ACNC: 1.01 UIU/ML (ref 0.3–4.2)
VIT B12 SERPL-MCNC: 471 PG/ML (ref 232–1245)
WBC # BLD AUTO: 10.4 10E3/UL (ref 4–11)

## 2023-07-14 PROCEDURE — 90792 PSYCH DIAG EVAL W/MED SRVCS: CPT | Mod: VID | Performed by: STUDENT IN AN ORGANIZED HEALTH CARE EDUCATION/TRAINING PROGRAM

## 2023-07-14 PROCEDURE — 82746 ASSAY OF FOLIC ACID SERUM: CPT

## 2023-07-14 PROCEDURE — 36415 COLL VENOUS BLD VENIPUNCTURE: CPT

## 2023-07-14 PROCEDURE — 82607 VITAMIN B-12: CPT

## 2023-07-14 PROCEDURE — 84443 ASSAY THYROID STIM HORMONE: CPT

## 2023-07-14 PROCEDURE — 80053 COMPREHEN METABOLIC PANEL: CPT

## 2023-07-14 PROCEDURE — 82306 VITAMIN D 25 HYDROXY: CPT

## 2023-07-14 PROCEDURE — 85014 HEMATOCRIT: CPT

## 2023-07-14 PROCEDURE — 90836 PSYTX W PT W E/M 45 MIN: CPT | Mod: VID | Performed by: STUDENT IN AN ORGANIZED HEALTH CARE EDUCATION/TRAINING PROGRAM

## 2023-07-14 RX ORDER — VENLAFAXINE HYDROCHLORIDE 37.5 MG/1
CAPSULE, EXTENDED RELEASE ORAL
Qty: 40 CAPSULE | Refills: 0 | Status: SHIPPED | OUTPATIENT
Start: 2023-07-14 | End: 2023-07-26

## 2023-07-14 RX ORDER — MIRTAZAPINE 15 MG/1
15 TABLET, FILM COATED ORAL AT BEDTIME
Status: CANCELLED | OUTPATIENT
Start: 2023-07-14

## 2023-07-14 RX ORDER — METRONIDAZOLE 500 MG/1
1 TABLET ORAL
COMMUNITY
Start: 2023-07-06 | End: 2024-02-19

## 2023-07-14 RX ORDER — TRETINOIN 0.25 MG/G
CREAM TOPICAL
COMMUNITY
Start: 2022-09-04

## 2023-07-14 ASSESSMENT — PAIN SCALES - GENERAL: PAINLEVEL: NO PAIN (0)

## 2023-07-14 NOTE — PATIENT INSTRUCTIONS
Thank you for your visit today.     Treatment Plan Today:     1) Medications - Start venlafaxine (Effexor). Take 1 tablet (37.5mg) for 4 days, then 2 tablets for 4 days, then 3 tablets for 4 days, then 4 tablets for 4 days. Your appointment with me will be sometime before the end of your medication supply.    2) Follow-up appointment with Dr Suero in about 2 weeks.    3) In the case of an emergency, crisis numbers are below and clinic after hours number is [phone].    Vale Suero MD  Psychiatry Resident Physician     **For crisis resources, please see the information at the end of this document**   Patient Education    Thank you for coming to the [facility] [place] [facility].     Lab Testing:  If you had lab testing today and your results are reassuring or normal they will be mailed to you or sent through [employer]) within 7 days. If the lab tests need quick action we will call you with the results. The phone number we will call with results is # [phone]. If this is not the best number please call our clinic and change the number.     Medication Refills:  If you need any refills please call your pharmacy and they will contact us. Our fax number for refills is [phone].   Three business days of notice are needed for general medication refill requests.   Five business days of notice are needed for controlled substance refill requests.   If you need to change to a different pharmacy, please contact the new pharmacy directly. The new pharmacy will help you get your medications transferred.     Contact Us:  Please call [phone] during business hours (8-5:00 M-F).   If you have medication related questions after clinic hours, or on the weekend, please call [phone].     Financial Assistance [phone]   Medical Records [phone]       MENTAL HEALTH CRISIS RESOURCES:  For a emergency help, please call 911 or go to the nearest Emergency Department.     Emergency  Walk-In Options:   EmPATH Unit @ [facility] ([place]): [phone] - Specialized mental health emergency area designed to be calming  LTAC, located within [facility] ([place]): [phone]  [facility] ([place]): [phone]  [facility] ([place]): [phone]    [facility] Crisis Information:   [place]: [phone]  [place]: [phone]  [place] ([place]) - Adult: [phone]     Child: [phone]  [place] - Adult: [phone]     Child: [phone]  [place]: [phone]  List of all [facility] resources:   https://[place].[facility]/dhs/people-we-serve/adults/health-care/mental-health/resources/crisis-contacts.jsp    National Crisis Information:   Crisis Text Line: Text  [place]  to 097979  Suicide & Crisis Lifeline: 988  National Suicide Prevention Lifeline: 6-125-588-TALK (1-169.853.8787)       For online chat options, visit https://suicidepreventionlifeline.org/chat/  Poison Control Center: 1-393.502.9275  Trans Lifeline: 1-140.419.7320 - Hotline for transgender people of all ages  The [employer] Project: 7-756-890-3603 - Hotline for LGBT youth     For Non-Emergency Support:   Fast Tracker: Mental Health & Substance Use Disorder Resources -   https://www.[employer] [facility].org/

## 2023-07-15 LAB — DEPRECATED CALCIDIOL+CALCIFEROL SERPL-MC: 15 UG/L (ref 20–75)

## 2023-07-19 DIAGNOSIS — F32.1 MAJOR DEPRESSIVE DISORDER, SINGLE EPISODE, MODERATE (H): Primary | ICD-10-CM

## 2023-07-19 RX ORDER — VENLAFAXINE HYDROCHLORIDE 75 MG/1
75 CAPSULE, EXTENDED RELEASE ORAL DAILY
Qty: 4 CAPSULE | Refills: 0 | Status: CANCELLED | OUTPATIENT
Start: 2023-07-23 | End: 2023-07-27

## 2023-07-19 RX ORDER — VENLAFAXINE HYDROCHLORIDE 150 MG/1
CAPSULE, EXTENDED RELEASE ORAL
Qty: 18 CAPSULE | Refills: 1 | Status: SHIPPED | OUTPATIENT
Start: 2023-07-19 | End: 2023-08-14

## 2023-07-19 RX ORDER — VENLAFAXINE HYDROCHLORIDE 75 MG/1
CAPSULE, EXTENDED RELEASE ORAL
Qty: 8 CAPSULE | Refills: 0 | Status: SHIPPED | OUTPATIENT
Start: 2023-07-19 | End: 2023-07-26

## 2023-07-19 RX ORDER — VENLAFAXINE HYDROCHLORIDE 37.5 MG/1
CAPSULE, EXTENDED RELEASE ORAL
Qty: 8 CAPSULE | Refills: 0 | Status: SHIPPED | OUTPATIENT
Start: 2023-07-19 | End: 2023-07-26

## 2023-07-19 RX ORDER — VENLAFAXINE HYDROCHLORIDE 37.5 MG/1
37.5 CAPSULE, EXTENDED RELEASE ORAL DAILY
Qty: 4 CAPSULE | Refills: 0 | Status: CANCELLED | OUTPATIENT
Start: 2023-07-19 | End: 2023-07-23

## 2023-07-19 NOTE — TELEPHONE ENCOUNTER
Writer followed up with pharmacy. Spoke to Marline.     They would like the script to be split due to insurance.    We can due 37.5 mg for qty 8 , and 75 mg for qty 8 . We can split between these to due total of 112 mg .    The Venlafaxine will just be 150 mg daily , qty for 4 .    Rosanne Tarango on 7/19/2023 at 11:55 AM

## 2023-07-19 NOTE — TELEPHONE ENCOUNTER
Dr. Suero,     Received alternative request from Pharmacy for Venlafaxine       Pharmacy comments:  Alternative requested : Insurance will not cover multiple tablets per day , please split prescription and send accordingly, Thank you .      Rosanne Tarango on 7/19/2023 at 7:02 AM

## 2023-07-20 NOTE — TELEPHONE ENCOUNTER
The prescription went through per Brion[facility] at [facility] in [employer].    Writer left message to patient that script was changed and she can follow up with the pharmacist if she have any questions.        Rosanne Tarango on 7/20/2023 at 10:08 AM

## 2023-07-21 ENCOUNTER — TELEPHONE (OUTPATIENT)
Dept: PSYCHIATRY | Facility: CLINIC | Age: 38
End: 2023-07-21
Payer: COMMERCIAL

## 2023-07-21 NOTE — TELEPHONE ENCOUNTER
Writer spoke with patient to check in after she started venlafaxine. Patient was unable to start the medication until today due to the prescription needing to be revised due to insurance limitations.    Patient inquired about recent lab results.  She will follow up with PCP regarding Vitamin D level to determine if further action is needed at this time.      RNCC Call scheduled for Tuesday 7/25.

## 2023-07-21 NOTE — TELEPHONE ENCOUNTER
----- Message from Brissa Luna RN sent at 7/17/2023  7:51 AM CDT -----  Message  Received: 3 days ago  Vale Suero MD  P Psychiatry Nurse-[facility],     Could you please call Tess around 7/21 to check in on how she's doing in terms of depressive symptoms or any medication side effects since starting venlafaxine?     Thank you!   Vale Suero        PT IS CALLING FOR CAP INSTRUCTIONS FOR RECALL . HER CHART ONLY GOES TO 2016 AND SHE IS NOT IN SYSTEM AS DR SEIPEL PT . GAVE HER MEDICAL RECORDS TO SEE WHEN LAST SEEN AND IF WE WERE ORDERING OR PCP.

## 2023-07-25 ENCOUNTER — TELEPHONE (OUTPATIENT)
Dept: PSYCHIATRY | Facility: CLINIC | Age: 38
End: 2023-07-25
Payer: COMMERCIAL

## 2023-07-25 NOTE — TELEPHONE ENCOUNTER
Deng Multani, RN  Deng Multani, RN; Brissa Luna, CONCETTA; Grace Barrett, CONCETTA; Meron Friend RN  Patient needs check in call after starting Venlafaxine.  She was not able to start the med until 7/21 due to rx revisions needed due to insurance limitations.     Deng       Follow up:     Reached out to patient to connect regarding how she is doing after starting Venlafaxine. No answer at number provided. LVM, requesting a call back. Clinic number provided.

## 2023-07-28 ENCOUNTER — VIRTUAL VISIT (OUTPATIENT)
Dept: PSYCHIATRY | Facility: CLINIC | Age: 38
End: 2023-07-28
Attending: STUDENT IN AN ORGANIZED HEALTH CARE EDUCATION/TRAINING PROGRAM
Payer: COMMERCIAL

## 2023-07-28 DIAGNOSIS — F32.1 MAJOR DEPRESSIVE DISORDER, SINGLE EPISODE, MODERATE (H): Primary | ICD-10-CM

## 2023-07-28 DIAGNOSIS — E55.9 VITAMIN D DEFICIENCY: ICD-10-CM

## 2023-07-28 PROCEDURE — 99214 OFFICE O/P EST MOD 30 MIN: CPT | Mod: GC | Performed by: STUDENT IN AN ORGANIZED HEALTH CARE EDUCATION/TRAINING PROGRAM

## 2023-07-28 RX ORDER — ERGOCALCIFEROL 1.25 MG/1
50000 CAPSULE, LIQUID FILLED ORAL WEEKLY
Qty: 8 CAPSULE | Refills: 0 | Status: SHIPPED | OUTPATIENT
Start: 2023-07-28 | End: 2023-10-16 | Stop reason: DRUGHIGH

## 2023-07-28 RX ORDER — VENLAFAXINE HYDROCHLORIDE 37.5 MG/1
CAPSULE, EXTENDED RELEASE ORAL
Qty: 3 CAPSULE | Refills: 0 | Status: SHIPPED | OUTPATIENT
Start: 2023-07-28 | End: 2023-08-14

## 2023-07-28 RX ORDER — VENLAFAXINE HYDROCHLORIDE 75 MG/1
CAPSULE, EXTENDED RELEASE ORAL
Qty: 6 CAPSULE | Refills: 0 | Status: SHIPPED | OUTPATIENT
Start: 2023-07-28 | End: 2023-08-14

## 2023-07-28 ASSESSMENT — PAIN SCALES - GENERAL: PAINLEVEL: NO PAIN (0)

## 2023-07-28 NOTE — PATIENT INSTRUCTIONS
Thank you for your visit today.     Treatment Plan Today:     1) Medications - We discussed slowing down the venlafaxine dose increases:  -Take 3 more days at 75mg (for a total of 7 days)  -Then 7 days at 112.5mg  -Then increase to 150mg    -Also, restart weekly Vitamin D supplement    2) Follow-up appointment with Dr Suero in about 2 weeks.     3) In the case of an emergency, crisis numbers are below and clinic after hours number is [phone].    Vale Suero MD  Psychiatry Resident Physician     **For crisis resources, please see the information at the end of this document**   Patient Education    Thank you for coming to the [facility] MENTAL HEALTH & ADDICTION [place] [facility].     Lab Testing:  If you had lab testing today and your results are reassuring or normal they will be mailed to you or sent through Gamestaq within 7 days. If the lab tests need quick action we will call you with the results. The phone number we will call with results is # [phone]. If this is not the best number please call our clinic and change the number.     Medication Refills:  If you need any refills please call your pharmacy and they will contact us. Our fax number for refills is [phone].   Three business days of notice are needed for general medication refill requests.   Five business days of notice are needed for controlled substance refill requests.   If you need to change to a different pharmacy, please contact the new pharmacy directly. The new pharmacy will help you get your medications transferred.     Contact Us:  Please call [phone] during business hours (8-5:00 M-F).   If you have medication related questions after clinic hours, or on the weekend, please call [phone].     Financial Assistance [phone]   Medical Records [phone]       MENTAL HEALTH CRISIS RESOURCES:  For a emergency help, please call 911 or go to the nearest Emergency Department.     Emergency Walk-In Options:    [facility] ([place]): [phone] - Specialized mental health emergency area designed to be calming  [facility] ([place]): [phone]  [facility] ([place]): [phone]  [facility] ([place]): [phone]    County Crisis Information:   [place]: [phone]  [place]: [phone]  [place] (DAVID) - Adult: [phone]     Child: [phone]  [place] Miguel - Adult: [phone]     Child: [phone]  [place]: [phone]  List of all [facility] resources:   https://[place].[facility]/dhs/people-we-serve/adults/health-care/mental-health/resources/crisis-contacts.jsp    National Crisis Information:   Crisis Text Line: Text  [place]  to 554771  Suicide & Crisis Lifeline: 988  National Suicide Prevention Lifeline: 5-776-922-PVGR (1-175.740.5316)       For online chat options, visit https://suicidepreventionlifeline.org/chat/  Poison Control Center: 1-309.790.6630  Trans Lifeline: 1-943.322.1116 - Hotline for transgender people of all ages  The Kalin Project: 0-290-927-5170 - Hotline for LGBT youth     For Non-Emergency Support:   Fast Tracker: Mental Health & Substance Use Disorder Resources -   https://www.[employer].org/
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
## 2023-07-28 NOTE — NURSING NOTE
Is the patient currently in the state of [place]? YES    Visit mode: Video     If the visit is dropped, the patient can be reconnected by: VIDEO VISIT: Text to cell phone: [phone]    Will anyone else be joining the visit? NO      How would you like to obtain your AVS? MyChart    Are changes needed to the allergy or medication list? NO    Reason for visit: RECHECK

## 2023-07-28 NOTE — TELEPHONE ENCOUNTER
Reached out to patient to connect and also help reschedule as she missed her appt today with provider. No answer at number provided. LVM, requesting a call back. Clinic number provided.

## 2023-08-01 ENCOUNTER — TRANSFERRED RECORDS (OUTPATIENT)
Dept: MULTI SPECIALTY CLINIC | Facility: CLINIC | Age: 38
End: 2023-08-01

## 2023-08-01 LAB — PAP SMEAR - HIM PATIENT REPORTED: NEGATIVE

## 2023-08-14 ENCOUNTER — VIRTUAL VISIT (OUTPATIENT)
Dept: PSYCHIATRY | Facility: CLINIC | Age: 38
End: 2023-08-14
Attending: PSYCHIATRY & NEUROLOGY
Payer: COMMERCIAL

## 2023-08-14 DIAGNOSIS — E55.9 VITAMIN D DEFICIENCY: ICD-10-CM

## 2023-08-14 DIAGNOSIS — F32.1 MAJOR DEPRESSIVE DISORDER, SINGLE EPISODE, MODERATE (H): Primary | ICD-10-CM

## 2023-08-14 PROCEDURE — 99214 OFFICE O/P EST MOD 30 MIN: CPT | Mod: VID | Performed by: STUDENT IN AN ORGANIZED HEALTH CARE EDUCATION/TRAINING PROGRAM

## 2023-08-14 RX ORDER — VENLAFAXINE HYDROCHLORIDE 150 MG/1
150 CAPSULE, EXTENDED RELEASE ORAL DAILY
Qty: 30 CAPSULE | Refills: 2 | Status: SHIPPED | OUTPATIENT
Start: 2023-08-14 | End: 2023-09-19

## 2023-08-14 ASSESSMENT — PATIENT HEALTH QUESTIONNAIRE - PHQ9
10. IF YOU CHECKED OFF ANY PROBLEMS, HOW DIFFICULT HAVE THESE PROBLEMS MADE IT FOR YOU TO DO YOUR WORK, TAKE CARE OF THINGS AT HOME, OR GET ALONG WITH OTHER PEOPLE: SOMEWHAT DIFFICULT
SUM OF ALL RESPONSES TO PHQ QUESTIONS 1-9: 2
SUM OF ALL RESPONSES TO PHQ QUESTIONS 1-9: 2

## 2023-08-14 ASSESSMENT — PAIN SCALES - GENERAL: PAINLEVEL: NO PAIN (0)

## 2023-08-14 NOTE — NURSING NOTE
Is the patient currently in the state of [place]? YES    Visit mode:VIDEO    If the visit is dropped, the patient can be reconnected by: VIDEO VISIT: Text to cell phone: [phone]    Will anyone else be joining the visit? NO      How would you like to obtain your AVS? MyChart    Are changes needed to the allergy or medication list? YES: Pt no longer takes liletta utrn, metronidazole 500 mg, valacyclovir 1000mg, venlafaxine 37.5mg & venlafaxine 75 mg. Please remove all from med list.    Reason for visit: SOPHIE De La Torre on 8/14/2023 at 2:51 PM

## 2023-08-14 NOTE — PATIENT INSTRUCTIONS
Thank you for your visit today.     Treatment Plan Today:     1) Medications - no changes    - Please get your Vitamin D level checked after you have finished all 8 weeks of the Vit D supplement. You can go to any [facility].    2) Follow-up appointment with Dr Suero in about 2 months.     3) In the case of an emergency, crisis numbers are below and clinic after hours number is [phone].    Vale Suero MD  Psychiatry Resident Physician     **For crisis resources, please see the information at the end of this document**   Patient Education    Thank you for coming to the [facility] MENTAL HEALTH & ADDICTION [place] [facility].     Lab Testing:  If you had lab testing today and your results are reassuring or normal they will be mailed to you or sent through DoseMe within 7 days. If the lab tests need quick action we will call you with the results. The phone number we will call with results is # [phone]. If this is not the best number please call our clinic and change the number.     Medication Refills:  If you need any refills please call your pharmacy and they will contact us. Our fax number for refills is [phone].   Three business days of notice are needed for general medication refill requests.   Five business days of notice are needed for controlled substance refill requests.   If you need to change to a different pharmacy, please contact the new pharmacy directly. The new pharmacy will help you get your medications transferred.     Contact Us:  Please call [phone] during business hours (8-5:00 M-F).   If you have medication related questions after clinic hours, or on the weekend, please call [phone].     Financial Assistance [phone]   Medical Records [phone]       MENTAL HEALTH CRISIS RESOURCES:  For a emergency help, please call 911 or go to the nearest Emergency Department.     Emergency Walk-In Options:   [facility] @ [facility]): [phone] -  Specialized mental health emergency area designed to be [facility]  [facility] ([place]): [phone]  [facility] ([place]): [phone]  [facility] ([place]): [phone]    [facility] Crisis Information:   [place]: [phone]  [place]: [phone]  [place] (DAVID) - Adult: [phone]     Child: [phone]  Jose Miguel - Adult: [phone]     Child: [phone]  [place]: [phone]  List of all [facility] resources:   [place]://[place].gov/dhs/people-we-serve/adults/health-care/mental-health/resources/crisis-contacts.jsp    National Crisis Information:   Crisis Text Line: Text  [place]  to 472481  Suicide & Crisis Lifeline: 988  National Suicide Prevention Lifeline: 5-991-168-TALK (1-343.660.2123)       For online chat options, visit https://suicidepreventionlifeline.org/chat/  Poison Control Center: 1-233.779.8409  Trans Lifeline: 1-631.568.2992 - Hotline for transgender people of all ages  The [employer] Project: 6-204-371-5272 - Hotline for LGBT youth     For Non-Emergency Support:   Fast Tracker: Mental Health & Substance Use Disorder Resources -   https://www.[employer].org/
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
## 2023-09-15 DIAGNOSIS — F32.1 MAJOR DEPRESSIVE DISORDER, SINGLE EPISODE, MODERATE (H): ICD-10-CM

## 2023-09-18 ENCOUNTER — MYC MEDICAL ADVICE (OUTPATIENT)
Dept: PSYCHIATRY | Facility: CLINIC | Age: 38
End: 2023-09-18
Payer: COMMERCIAL

## 2023-09-18 DIAGNOSIS — F32.1 MAJOR DEPRESSIVE DISORDER, SINGLE EPISODE, MODERATE (H): ICD-10-CM

## 2023-09-18 RX ORDER — VENLAFAXINE HYDROCHLORIDE 150 MG/1
150 CAPSULE, EXTENDED RELEASE ORAL DAILY
Qty: 30 CAPSULE | Refills: 2 | OUTPATIENT
Start: 2023-09-18

## 2023-09-19 RX ORDER — VENLAFAXINE HYDROCHLORIDE 150 MG/1
150 CAPSULE, EXTENDED RELEASE ORAL DAILY
Qty: 90 CAPSULE | Refills: 0 | Status: SHIPPED | OUTPATIENT
Start: 2023-09-19 | End: 2023-10-16

## 2023-09-19 NOTE — TELEPHONE ENCOUNTER
Reached out to patient to connect as requested by patient. Patient is requesting a refill for Venlafaxine. She is unsure if the dose will stay the same or change. Writer agreed to start working on the refill and will reach out to provider for approval.     Last seen: 8/14  RTC: 2 months   Cancel: none   No-show: none   Next appt: 10/16    Incoming refill from patient via [employer]       Disp Refills Start End BETHANY    venlafaxine (EFFEXOR XR) 150 MG 24 hr capsule 30 capsule 2 8/14/2023  No   Sig - Route: Take 1 capsule (150 mg) by mouth daily Take one capsule ( 150 mg ) daily - Oral   Sent to pharmacy as: Venlafaxine HCl  MG Oral Capsule Extended Release 24 Hour (EFFEXOR XR)   Class: E-Prescribe   Order: [id number]   E-Prescribing Status: Receipt confirmed by pharmacy (8/14/2023  6:33 PM CDT)     Per chart review, patient should have refills on file at the pharmacy. Will reach out to provider to confirm if patient is to continue the same dose.

## 2023-09-19 NOTE — TELEPHONE ENCOUNTER
Reached out to [facility] and spoke with pharmacist who confirmed that patient has Effexor 150 mg tabs on file for a 60 day supply. Her insurance is only covering 90 day supply at this time. She is requesting a new script be sent to the pharmacy reflecting that. Writer agreed to reach out to provider for approval.

## 2023-09-19 NOTE — TELEPHONE ENCOUNTER
- Meds refilled by provider   - Med tab changed to reflect this   - Patient notified via [employer]

## 2023-09-19 NOTE — TELEPHONE ENCOUNTER
Vale Suero MD  You25 minutes ago (10:47 AM)     CR  Yes, we'll continue the same dose until we can discuss more at our next appointment. Thank you!  Vale   Patient notified via [employer]

## 2023-10-13 ENCOUNTER — LAB (OUTPATIENT)
Dept: LAB | Facility: CLINIC | Age: 38
End: 2023-10-13
Payer: COMMERCIAL

## 2023-10-13 DIAGNOSIS — E55.9 VITAMIN D DEFICIENCY: ICD-10-CM

## 2023-10-13 LAB
HOLD SPECIMEN: NORMAL
VIT D+METAB SERPL-MCNC: 27 NG/ML (ref 20–50)

## 2023-10-13 PROCEDURE — 36415 COLL VENOUS BLD VENIPUNCTURE: CPT

## 2023-10-13 PROCEDURE — 82306 VITAMIN D 25 HYDROXY: CPT

## 2023-10-16 ENCOUNTER — VIRTUAL VISIT (OUTPATIENT)
Dept: PSYCHIATRY | Facility: CLINIC | Age: 38
End: 2023-10-16
Attending: PSYCHIATRY & NEUROLOGY
Payer: COMMERCIAL

## 2023-10-16 DIAGNOSIS — F32.1 MAJOR DEPRESSIVE DISORDER, SINGLE EPISODE, MODERATE (H): Primary | ICD-10-CM

## 2023-10-16 DIAGNOSIS — E55.9 VITAMIN D DEFICIENCY: ICD-10-CM

## 2023-10-16 PROCEDURE — 99214 OFFICE O/P EST MOD 30 MIN: CPT | Mod: HN | Performed by: STUDENT IN AN ORGANIZED HEALTH CARE EDUCATION/TRAINING PROGRAM

## 2023-10-16 ASSESSMENT — PATIENT HEALTH QUESTIONNAIRE - PHQ9
10. IF YOU CHECKED OFF ANY PROBLEMS, HOW DIFFICULT HAVE THESE PROBLEMS MADE IT FOR YOU TO DO YOUR WORK, TAKE CARE OF THINGS AT HOME, OR GET ALONG WITH OTHER PEOPLE: NOT DIFFICULT AT ALL
SUM OF ALL RESPONSES TO PHQ QUESTIONS 1-9: 4
SUM OF ALL RESPONSES TO PHQ QUESTIONS 1-9: 4

## 2023-10-16 ASSESSMENT — PAIN SCALES - GENERAL: PAINLEVEL: NO PAIN (0)

## 2023-10-16 NOTE — NURSING NOTE
Is the patient currently in the state of [place]? YES    Visit mode:VIDEO    If the visit is dropped, the patient can be reconnected by: VIDEO VISIT: Send to e-mail at: [email or web address]    Will anyone else be joining the visit? NO  (If patient encounters technical issues they should call [phone]    How would you like to obtain your AVS? MyChart    Are changes needed to the allergy or medication list? Yes Pt now has the Mirena IUD. Please add to med list.    Reason for visit: RECHECK    Unable to complete all qnrs due to time.    Rocio MOULTON
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
## 2023-10-16 NOTE — PATIENT INSTRUCTIONS
Thank you for your visit today.     Treatment Plan Today:     1) Plan: Decrease Vit D to 25mcg daily, continue venlafaxine 150mg daily    2) Follow-up appointment with Dr Suero in about 2 months.     3) In the case of an emergency, crisis numbers are below and clinic after hours number is [phone].    Vale Suero MD  Psychiatry Resident Physician     **For crisis resources, please see the information at the end of this document**   Patient Education    Thank you for coming to the [facility] [place] [facility].     Lab Testing:  If you had lab testing today and your results are reassuring or normal they will be mailed to you or sent through [facility] within 7 days. If the lab tests need quick action we will call you with the results. The phone number we will call with results is # [phone]. If this is not the best number please call our clinic and change the number.     Medication Refills:  If you need any refills please call your pharmacy and they will contact us. Our fax number for refills is [phone].   Three business days of notice are needed for general medication refill requests.   Five business days of notice are needed for controlled substance refill requests.   If you need to change to a different pharmacy, please contact the new pharmacy directly. The new pharmacy will help you get your medications transferred.     Contact Us:  Please call [phone] during business hours (8-5:00 M-F).   If you have medication related questions after clinic hours, or on the weekend, please call [phone].     Financial Assistance [phone]   Medical Records [phone]       MENTAL HEALTH CRISIS RESOURCES:  For a emergency help, please call 911 or go to the nearest Emergency Department.     Emergency Walk-In Options:   EmPATH Unit @ [facility] ([place]): [phone] - Specialized mental health emergency area designed to be calming  [facility]  [facility] ([place]): [phone]  [facility] ([place]): [phone]  [facility] ([place]): [phone]    [facility] Crisis Information:   Brian: [phone]  Melvin: [phone]  Suzie (DAVID) - Adult: [phone]     Child: [phone]  Jose Miguel - Adult: [phone]     Child: [phone]  [place]: [phone]  List of all [facility] resources:   https://[place].[facility]/dhs/people-we-serve/adults/health-care/mental-health/resources/crisis-contacts.jsp    National Crisis Information:   Crisis Text Line: Text  [place]  to 920287  Suicide & Crisis Lifeline: 988  National Suicide Prevention Lifeline: 2-154-821-TALK (1-567.742.2098)       For online chat options, visit https://suicidepreventionlifeline.org/chat/  Poison Control Center: 5-933-326-7044  Trans Lifeline: 2-752-595-3878 - Hotline for transgender people of all ages  The Kal[employer] Project: 1-007-066-1816 - Hotline for LGBT youth     For Non-Emergency Support:   Fast Tracker: Mental Health & Substance Use Disorder Resources -   https://www.[facility].org/

## 2023-10-17 RX ORDER — VENLAFAXINE HYDROCHLORIDE 150 MG/1
150 CAPSULE, EXTENDED RELEASE ORAL DAILY
Qty: 90 CAPSULE | Refills: 0 | Status: SHIPPED | OUTPATIENT
Start: 2023-10-17 | End: 2024-03-04

## 2023-10-17 RX ORDER — VITAMIN B COMPLEX
1 TABLET ORAL DAILY
Qty: 90 TABLET | Refills: 0 | Status: SHIPPED | OUTPATIENT
Start: 2023-10-17 | End: 2024-01-29

## 2024-01-28 DIAGNOSIS — E55.9 VITAMIN D DEFICIENCY: ICD-10-CM

## 2024-01-29 RX ORDER — VITAMIN B COMPLEX
1 TABLET ORAL DAILY
Qty: 30 TABLET | Refills: 0 | Status: SHIPPED | OUTPATIENT
Start: 2024-01-29 | End: 2024-03-04

## 2024-01-29 NOTE — TELEPHONE ENCOUNTER
Date of Last Office Visit: 10/16/2023  [facility]     Vale Suero MD     Date of Next Office Visit: 0  No shows since last visit: 0  Cancellations since last visit: 0  ------------------------------  Medication requested:  Vitamin D3 (CHOLECALCIFEROL) 25 mcg (1000 units) tablet  Date last ordered: 10/17/2023 Qty: 90 Refills: 0           Sig - Route: Take 1 tablet (25 mcg) by mouth daily - Oral  Sent to pharmacy as: Vitamin D 25 MCG (1000 UT) Oral Tablet (CHOLECALCIFEROL)  Class: E-Prescribe  ------------------------------     Lapse in medication adherence greater than 5 days?: no  If yes, call patient and gather details: -  Medication refill request verified as identical to current order?: yes       Last Visit Treatment Plan     1) PSYCHOTROPIC MEDICATIONS:  - Continue venlafaxine 150mg daily  - Decrease Vit D from 69249C weekly to 1000 units daily     2) THERAPY:  Continue with outside therapist.      3) MONITORING:  Labs-   Vit D 27 on 10/13/23. Recheck in 6 months 04/2024.  Routine monitoring is not indicated for current psychotropic medication regimen. Baseline TSH, CBC, CMP, B12, folate normal on 7/14/23.   EKG- Routine monitoring is not indicated for current psychotropic medication regimen   Rating scales-  PHQ9    4) REFERRALS:  none     5) RTC: 2 months with Dr Suero.        Refill decision: Medication refilled 30 days per protocol.

## 2024-02-19 ENCOUNTER — OFFICE VISIT (OUTPATIENT)
Dept: FAMILY MEDICINE | Facility: CLINIC | Age: 39
End: 2024-02-19
Payer: COMMERCIAL

## 2024-02-19 VITALS
WEIGHT: 146.6 LBS | SYSTOLIC BLOOD PRESSURE: 119 MMHG | HEART RATE: 82 BPM | TEMPERATURE: 98.3 F | OXYGEN SATURATION: 97 % | RESPIRATION RATE: 16 BRPM | BODY MASS INDEX: 27.7 KG/M2 | DIASTOLIC BLOOD PRESSURE: 85 MMHG

## 2024-02-19 DIAGNOSIS — F32.1 MAJOR DEPRESSIVE DISORDER, SINGLE EPISODE, MODERATE (H): ICD-10-CM

## 2024-02-19 DIAGNOSIS — G43.909 MIGRAINE WITHOUT STATUS MIGRAINOSUS, NOT INTRACTABLE, UNSPECIFIED MIGRAINE TYPE: ICD-10-CM

## 2024-02-19 DIAGNOSIS — R51.9 CHRONIC NONINTRACTABLE HEADACHE, UNSPECIFIED HEADACHE TYPE: Primary | ICD-10-CM

## 2024-02-19 DIAGNOSIS — Z97.5 IUD (INTRAUTERINE DEVICE) IN PLACE: ICD-10-CM

## 2024-02-19 DIAGNOSIS — G89.29 CHRONIC NONINTRACTABLE HEADACHE, UNSPECIFIED HEADACHE TYPE: Primary | ICD-10-CM

## 2024-02-19 DIAGNOSIS — Z12.4 CERVICAL CANCER SCREENING: ICD-10-CM

## 2024-02-19 DIAGNOSIS — R11.0 NAUSEA: ICD-10-CM

## 2024-02-19 DIAGNOSIS — L98.9 SKIN LESION: ICD-10-CM

## 2024-02-19 PROCEDURE — 99215 OFFICE O/P EST HI 40 MIN: CPT | Performed by: INTERNAL MEDICINE

## 2024-02-19 RX ORDER — SUMATRIPTAN 50 MG/1
50 TABLET, FILM COATED ORAL
Qty: 9 TABLET | Refills: 1 | Status: SHIPPED | OUTPATIENT
Start: 2024-02-19

## 2024-02-19 RX ORDER — ONDANSETRON 4 MG/1
4 TABLET, ORALLY DISINTEGRATING ORAL EVERY 6 HOURS PRN
Qty: 15 TABLET | Refills: 1 | Status: SHIPPED | OUTPATIENT
Start: 2024-02-19

## 2024-02-19 RX ORDER — TOPIRAMATE 25 MG/1
50 TABLET, FILM COATED ORAL AT BEDTIME
Qty: 60 TABLET | Refills: 1 | Status: SHIPPED | OUTPATIENT
Start: 2024-02-19 | End: 2024-03-16

## 2024-02-19 ASSESSMENT — PAIN SCALES - GENERAL: PAINLEVEL: MODERATE PAIN (5)

## 2024-02-19 NOTE — PROGRESS NOTES
Tess was seen today for headache, breast problem and health maintenance.    Diagnoses and all orders for this visit:    Chronic nonintractable headache, unspecified headache type  She c/o headache, dizziness, vomiting and nausea which started 3-4 weeks ago. She did have a cold which has subsided, but she continues to c/o nausea on and off throughout the day. Notably, she has had headache since she was young, but does not have a proper diagnosis of migraine. She is not on any management since she learns to deal with it. Per patient, she got headache episode once or twice per year - recent episode in 01/2024. Since last episode, her frontal headache has become more frequent and persisting. She also notes that dizziness occurs usually after getting up from the bed and only lasts for a while. Denies vertigo. Not on any management. She also c/o taste changes which she attributes to the medications. She has been taking effexor 150 mg and vitamin D together, but recently not taking vitamin D. She also wonders whether she is pregnant due to her recent symptoms. She follows ophthalmology regularly - next appointment in 04/2024.   -     topiramate (TOPAMAX) 25 MG tablet; Take 2 tablets (50 mg) by mouth at bedtime for prevention of headache  -     SUMAtriptan (IMITREX) 50 MG tablet; Take 1 tablet (50 mg) by mouth at onset of headache for migraine May repeat in 2 hours. Max 4 tablets/24 hours.  -     CT Head w/o Contrast; Future  Her migraine might be contributing to her dizziness, nausea and migraine.   Started her on Topriamate for both weight loss and for prevention of migraine. She does not have any pregnancy plan and has Mirena IUD in place.   If her symptom does not improve, we will refer her to neurology.  Gave her Zofran for nausea and imitrex for headache relief.   Ordered CT brain for persisting headache.   Stay well hydrated.   Follow up with PCP in 4 weeks.     She has no focal neurological deficit     Migraine  without status migrainosus, not intractable, unspecified migraine type  -     SUMAtriptan (IMITREX) 50 MG tablet; Take 1 tablet (50 mg) by mouth at onset of headache for migraine May repeat in 2 hours. Max 4 tablets/24 hours.  -     CT Head w/o Contrast; Future    Cervical cancer screening  She follows [facility] and had pap done in 08/2023.     Nausea  -     ondansetron (ZOFRAN ODT) 4 MG ODT tab; Take 1 tablet (4 mg) by mouth every 6 hours as needed for nausea  See note above    IUD (intrauterine device) in place  Mirena IUD in place     Major depressive disorder, single episode, moderate (H)  Follows psychiatry and is on Effexor 150 mg. However, she c/o weight loss and plans to weans off effexor. Today weight at 146 lb     Explained the weaning process for Effexor - she understands that she will have to taper down slowly.     Discussed injectable options for weight but there has been a shortage.   Wellbutrin is also a good option for her since it helps with weight loss. She will follow with her physiatrist to discuss about this.     Skin lesion  Comments:  on breast nipple  Advised patient to schedule mammogram after 05/30/2024  She will follow up with her dermatology regarding skin lesion on her nipple  Small skin tags on nipple  Breast exam was normal.      Subjective   Tess is a 38 year old, presenting for the following health issues:  Headache (Along with nausea and vomiting ), Breast Problem, and Health Maintenance (PAP SMEAR: [facility])         No data to display              History of Present Illness       Reason for visit:  For the past month I have been experiencing headaches, nausea and vomiting. Also i would like to get checked for small cysts on my areola breast area.  Symptom onset:  3-4 weeks ago  Symptoms include:  Uncontrolled nausea and headaches. Vomiting this morning at around 2 am; vomited about a month ago.   I have experienced these symptoms for about the past month.  Symptom intensity:   Moderate  Symptom progression:  Staying the same  Had these symptoms before:  No  What makes it worse:  It's very weird because there are days when i feel okay.  But suddenly the symptoms appear and linger for days.  What makes it better:  When I sleep    She eats 0-1 servings of fruits and vegetables daily.She consumes 1 sweetened beverage(s) daily.She exercises with enough effort to increase her heart rate 10 to 19 minutes per day.  She exercises with enough effort to increase her heart rate 3 or less days per week.   She is taking medications regularly.     Review of Systems  Constitutional, HEENT, cardiovascular, pulmonary, GI, , musculoskeletal, neuro, skin, endocrine and psych systems are negative, except as otherwise noted.      Objective    /85 (BP Location: Right arm, Patient Position: Sitting, Cuff Size: Adult Regular)   Pulse 82   Temp 98.3  F (36.8  C) (Oral)   Resp 16   Wt 66.5 kg (146 lb 9.6 oz)   SpO2 97%   BMI 27.70 kg/m    Body mass index is 27.7 kg/m .  Physical Exam   GENERAL: alert and no distress  EYES: Eyes grossly normal to inspection, PERRL and conjunctivae and sclerae normal  HENT: ear canals and TM's normal, nose and mouth without ulcers or lesions  NECK: no adenopathy, no asymmetry, masses, or scars  RESP: lungs clear to auscultation - no rales, rhonchi or wheezes  BREAST: normal without masses, tenderness or nipple discharge and no palpable axillary masses or adenopathy  CV: regular rate and rhythm, normal S1 S2, no S3 or S4, no murmur, click or rub, no peripheral edema  MS: no gross musculoskeletal defects noted, no edema  SKIN: small skin lesions on her nipple, both sides - right is chronic but left is new   NEURO: Normal strength and tone, mentation intact and speech normal  PSYCH: mentation appears normal, affect normal/bright  Looked comfortable         40 minutes spent on the date of the encounter doing chart review, history and exam, documentation and further  activities as noted above    Signed Electronically by: Darlene Alcazar MD      This document serves as a record of the services and decisions personally performed and made by Dr. Alcazar. It was created on her behalf by Mary Solorzano, a trained medical scribe. The creation of this document is based the provider's statements to the medical scribe.

## 2024-02-19 NOTE — Clinical Note
Patient reported that she had pap test done approximately on 8/1/2023 at [facility]  and it was normal

## 2024-02-19 NOTE — PATIENT INSTRUCTIONS
Start taking topiramate 25 mg one tablet daily at bed time after 3-5 days go to two tablet at bed time for prevention of headache  Stay well hydrated   It is absolutely contraindicated during pregnancy    Use ondansetron as needed for nausea  Take Imitrex as  needed for headache  Take as soon as headache comes     Please call radiology by dialing  [phone] to schedule your  Head CT    You will be due for mammogram after your 39 th birthday   Please call the following number to make appointment :  [phone]  It is located in [address]    Make appointment with your dermatologist for evaluation of skin lesion on your breast       Follow up with Wicho Gray in 4-5 weeks  Seek sooner medical attention if there is any worsening of symptoms or problems.

## 2024-02-21 ENCOUNTER — TRANSFERRED RECORDS (OUTPATIENT)
Dept: HEALTH INFORMATION MANAGEMENT | Facility: CLINIC | Age: 39
End: 2024-02-21
Payer: COMMERCIAL

## 2024-02-26 ENCOUNTER — ANCILLARY PROCEDURE (OUTPATIENT)
Dept: CT IMAGING | Facility: CLINIC | Age: 39
End: 2024-02-26
Attending: INTERNAL MEDICINE
Payer: COMMERCIAL

## 2024-02-26 DIAGNOSIS — G43.909 MIGRAINE WITHOUT STATUS MIGRAINOSUS, NOT INTRACTABLE, UNSPECIFIED MIGRAINE TYPE: ICD-10-CM

## 2024-02-26 DIAGNOSIS — G89.29 CHRONIC NONINTRACTABLE HEADACHE, UNSPECIFIED HEADACHE TYPE: ICD-10-CM

## 2024-02-26 DIAGNOSIS — R51.9 CHRONIC NONINTRACTABLE HEADACHE, UNSPECIFIED HEADACHE TYPE: ICD-10-CM

## 2024-02-26 PROCEDURE — 70450 CT HEAD/BRAIN W/O DYE: CPT

## 2024-02-26 NOTE — LETTER
February 27, 2024      Tess Rojo  [address]   [place] [address]        Dear Ms.Cortes Rojo,      My name is Dr. Basurto and I am covering for Dr. Alcazar who is out of the office today.      Good news! This is a normal head CT scan.     Sincerely,     Dr. Basurto     Resulted Orders   CT Head w/o Contrast    Narrative    EXAM: CT HEAD W/O CONTRAST  LOCATION: [facility]  DATE: 2/26/2024    INDICATION: headache getting more frequent and severe  COMPARISON: None.  TECHNIQUE: Routine CT Head without IV contrast. Multiplanar reformats. Dose reduction techniques were used.    FINDINGS:  INTRACRANIAL CONTENTS: No intracranial hemorrhage, extraaxial collection, or mass effect.  No CT evidence of acute infarct. Normal parenchymal attenuation. Normal ventricles and sulci.     VISUALIZED ORBITS/SINUSES/MASTOIDS: Right scleral banding postoperative changes are noted. No paranasal sinus mucosal disease. No middle ear or mastoid effusion.    BONES/SOFT TISSUES: No acute abnormality.      Impression    IMPRESSION:  1.  No acute intracranial abnormality.

## 2024-02-27 NOTE — RESULT ENCOUNTER NOTE
The following letter pertains to your most recent diagnostic tests:    My name is Dr. Basurto and I am covering for Dr. Alcazar who is out of the office today.      Good news! This is a normal head CT scan.      Sincerely,    Dr. Basurto

## 2024-02-28 ENCOUNTER — PATIENT OUTREACH (OUTPATIENT)
Dept: CARE COORDINATION | Facility: CLINIC | Age: 39
End: 2024-02-28
Payer: COMMERCIAL

## 2024-03-04 ENCOUNTER — OFFICE VISIT (OUTPATIENT)
Dept: PSYCHIATRY | Facility: CLINIC | Age: 39
End: 2024-03-04
Attending: PSYCHIATRY & NEUROLOGY
Payer: COMMERCIAL

## 2024-03-04 VITALS
BODY MASS INDEX: 27.74 KG/M2 | DIASTOLIC BLOOD PRESSURE: 63 MMHG | SYSTOLIC BLOOD PRESSURE: 121 MMHG | WEIGHT: 146.8 LBS | HEART RATE: 71 BPM

## 2024-03-04 DIAGNOSIS — F32.5 MAJOR DEPRESSIVE DISORDER WITH SINGLE EPISODE, IN FULL REMISSION (H): Primary | ICD-10-CM

## 2024-03-04 DIAGNOSIS — E55.9 VITAMIN D DEFICIENCY: ICD-10-CM

## 2024-03-04 PROCEDURE — 90833 PSYTX W PT W E/M 30 MIN: CPT | Mod: HN | Performed by: STUDENT IN AN ORGANIZED HEALTH CARE EDUCATION/TRAINING PROGRAM

## 2024-03-04 PROCEDURE — 99214 OFFICE O/P EST MOD 30 MIN: CPT | Mod: HN | Performed by: STUDENT IN AN ORGANIZED HEALTH CARE EDUCATION/TRAINING PROGRAM

## 2024-03-04 PROCEDURE — 99214 OFFICE O/P EST MOD 30 MIN: CPT | Performed by: STUDENT IN AN ORGANIZED HEALTH CARE EDUCATION/TRAINING PROGRAM

## 2024-03-04 RX ORDER — VENLAFAXINE HYDROCHLORIDE 75 MG/1
75 CAPSULE, EXTENDED RELEASE ORAL DAILY
Qty: 30 CAPSULE | Refills: 1 | Status: SHIPPED | OUTPATIENT
Start: 2024-03-04 | End: 2024-03-11

## 2024-03-04 RX ORDER — VITAMIN B COMPLEX
1 TABLET ORAL DAILY
Qty: 30 TABLET | Refills: 1 | Status: SHIPPED | OUTPATIENT
Start: 2024-03-04 | End: 2024-04-18

## 2024-03-04 RX ORDER — VENLAFAXINE HYDROCHLORIDE 37.5 MG/1
37.5 CAPSULE, EXTENDED RELEASE ORAL DAILY
Qty: 14 CAPSULE | Refills: 0 | Status: SHIPPED | OUTPATIENT
Start: 2024-03-04 | End: 2024-03-11

## 2024-03-04 ASSESSMENT — PATIENT HEALTH QUESTIONNAIRE - PHQ9
SUM OF ALL RESPONSES TO PHQ QUESTIONS 1-9: 6
10. IF YOU CHECKED OFF ANY PROBLEMS, HOW DIFFICULT HAVE THESE PROBLEMS MADE IT FOR YOU TO DO YOUR WORK, TAKE CARE OF THINGS AT HOME, OR GET ALONG WITH OTHER PEOPLE: NOT DIFFICULT AT ALL
SUM OF ALL RESPONSES TO PHQ QUESTIONS 1-9: 6

## 2024-03-04 ASSESSMENT — PAIN SCALES - GENERAL: PAINLEVEL: NO PAIN (0)

## 2024-03-04 NOTE — PATIENT INSTRUCTIONS
Thank you for your visit today.     Treatment Plan Today:     1) Plan:   -Decrease Effexor to 112.5mg daily for 2 weeks, then decrease to 75mg daily    2) Follow-up appointment with Dr Suero in about 6 weeks.     3) In the case of an emergency, crisis numbers are below and clinic after hours number is [phone].    Vale Suero MD  Psychiatry Resident Physician     **For crisis resources, please see the information at the end of this document**   Patient Education    Thank you for coming to the [facility] [place] [facility].     Lab Testing:  If you had lab testing today and your results are reassuring or normal they will be mailed to you or sent through [facility] within 7 days. If the lab tests need quick action we will call you with the results. The phone number we will call with results is # [phone]. If this is not the best number please call our clinic and change the number.     Medication Refills:  If you need any refills please call your pharmacy and they will contact us. Our fax number for refills is [phone].   Three business days of notice are needed for general medication refill requests.   Five business days of notice are needed for controlled substance refill requests.   If you need to change to a different pharmacy, please contact the new pharmacy directly. The new pharmacy will help you get your medications transferred.     Contact Us:  Please call [phone] during business hours (8-5:00 M-F).   If you have medication related questions after clinic hours, or on the weekend, please call [phone].     Financial Assistance [phone]   Medical Records [phone]       MENTAL HEALTH CRISIS RESOURCES:  For a emergency help, please call 911 or go to the nearest Emergency Department.     Emergency Walk-In Options:   EmPATH Unit @ [facility] ([place]): [phone] - Specialized mental health emergency area designed to be calming  [facility]  [facility] ([place]): [phone]  [facility] ([place]): [phone]  [facility] ([place]): [phone]    [facility] Crisis Information:   Brian: [phone]  Melvin: [phone]  Suzie (DAVID) - Adult: [phone]     Child: [phone]  Jose Miguel - Adult: [phone]     Child: [phone]  [place]: [phone]  List of all [facility] resources:   https://[place].[facility]/dhs/people-we-serve/adults/health-care/mental-health/resources/crisis-contacts.jsp    National Crisis Information:   Crisis Text Line: Text  [place]  to 369619  Suicide & Crisis Lifeline: 988  National Suicide Prevention Lifeline: 2-086-705-TALK (1-212.713.4116)       For online chat options, visit https://suicidepreventionlifeline.org/chat/  Poison Control Center: 1-421.840.4404  Trans Lifeline: 5-463-091-2384 - Hotline for transgender people of all ages  The Kalin Project: 1-176.206.5382 - Hotline for LGBT youth     For Non-Emergency Support:   Fast Tracker: Mental Health & Substance Use Disorder Resources -   https://www.[facility].org/
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
## 2024-03-04 NOTE — NURSING NOTE
Chief Complaint   Patient presents with    Recheck Medication     Major depressive disorder, single episode, moderate     - Idris Dejesus, Visit Facilitator

## 2024-03-11 ENCOUNTER — MYC REFILL (OUTPATIENT)
Dept: PSYCHIATRY | Facility: CLINIC | Age: 39
End: 2024-03-11

## 2024-03-11 DIAGNOSIS — F32.5 MAJOR DEPRESSIVE DISORDER WITH SINGLE EPISODE, IN FULL REMISSION (H): ICD-10-CM

## 2024-03-11 RX ORDER — VENLAFAXINE HYDROCHLORIDE 37.5 MG/1
37.5 CAPSULE, EXTENDED RELEASE ORAL DAILY
Qty: 6 CAPSULE | Refills: 0 | Status: SHIPPED | OUTPATIENT
Start: 2024-03-11 | End: 2024-04-02

## 2024-03-11 RX ORDER — VENLAFAXINE HYDROCHLORIDE 75 MG/1
CAPSULE, EXTENDED RELEASE ORAL
Qty: 90 CAPSULE | Refills: 0 | Status: SHIPPED | OUTPATIENT
Start: 2024-03-11 | End: 2024-04-02

## 2024-03-11 NOTE — TELEPHONE ENCOUNTER
Writer called [facility] pharmacy and was informed the patient's insurance will only cover a 90 day supply of the venlafaxine. This is true of both strengths. Per pharmacy staff, the patient paid out of pocket for the 37.5 mg capsules. The 75 mg capsules will cost over $40 for the 30 day supply.     Will confirm how many days the patient has used the 37.5 mg caps to equal the TDD of 112.5 mg. Will then suggest another small supply of 37.5 mg and 90 d/s of 75 mg if she will continue with this daily dose.    Elizabeth Gutierrez RN on 3/11/2024 at 4:05 PM

## 2024-03-13 ENCOUNTER — PATIENT OUTREACH (OUTPATIENT)
Dept: CARE COORDINATION | Facility: CLINIC | Age: 39
End: 2024-03-13

## 2024-03-14 ENCOUNTER — OFFICE VISIT (OUTPATIENT)
Dept: URGENT CARE | Facility: URGENT CARE | Age: 39
End: 2024-03-14

## 2024-03-14 VITALS
TEMPERATURE: 98.5 F | HEART RATE: 85 BPM | SYSTOLIC BLOOD PRESSURE: 119 MMHG | RESPIRATION RATE: 16 BRPM | OXYGEN SATURATION: 98 % | DIASTOLIC BLOOD PRESSURE: 85 MMHG

## 2024-03-14 DIAGNOSIS — J06.9 UPPER RESPIRATORY TRACT INFECTION, UNSPECIFIED TYPE: Primary | ICD-10-CM

## 2024-03-14 LAB
DEPRECATED S PYO AG THROAT QL EIA: NEGATIVE
GROUP A STREP BY PCR: NOT DETECTED

## 2024-03-14 PROCEDURE — 87651 STREP A DNA AMP PROBE: CPT | Performed by: EMERGENCY MEDICINE

## 2024-03-14 PROCEDURE — 99213 OFFICE O/P EST LOW 20 MIN: CPT

## 2024-03-14 RX ORDER — BENZONATATE 100 MG/1
100 CAPSULE ORAL 3 TIMES DAILY PRN
Qty: 25 CAPSULE | Refills: 0 | Status: SHIPPED | OUTPATIENT
Start: 2024-03-14

## 2024-03-14 NOTE — PROGRESS NOTES
Assessment & Plan     Diagnosis:    ICD-10-CM    1. Upper respiratory tract infection, unspecified type  J06.9 Streptococcus A Rapid Screen w/Reflex to PCR - Clinic Collect     Group A Streptococcus PCR Throat Swab     benzonatate (TESSALON) 100 MG capsule        Medical Decision Making:  Francisca Rojo is a 38 year old female who presents for evaluation of cough, sore throat, fatigue.  This is consistent with an upper respiratory tract infection.  There is no signs at this point of serious bacterial infection such as OM, RPA, epiglottitis, PTA, pneumonia, sinusitis, meningitis, serious bacterial infection.  Rapid strep is negative with PCR pending.     Given clear lungs, no fever, no hypoxia and no respiratory distress I do not feel the patient needs a CXR at this point as the probability of bacterial pneumonia is very unlikely.       There are no gastrointestinal symptoms at this point and no signs of dehydration.  Close followup with PCP is indicated.  Go to ED for fever > 102 F, protracted vomiting, worsening shortness of breath, chest pain or other concerns.  Patient verbalized understanding and agreement with the plan. Patient was discharged from clinic in stable condition.      Jareth Grier PA-C  [facility]    Subjective     Francisca Rojo is a 38 year old female who presents to clinic today for the following health issues:  Chief Complaint   Patient presents with    Urgent Care     Sore throat, difficult with talking, cough  x  Monday -worsening        HPI  Patient reports sore throat, non-productive cough and fatigue since Monday, has been getting progressively worse, slightly hoarse voice.  States she does feel better this morning after taking some cold medication; Dayquil she believes. No chest pain, difficulty swallowing or breathing currently, diarrhea, nausea, vomiting, abdominal pain, ear pain, sinus pain or pressure, neck stiffness or other concerns.    Review of  Systems    See HPI    Objective      Vitals: /85   Pulse 85   Temp 98.5  F (36.9  C) (Tympanic)   Resp 16   SpO2 98%       Patient Vitals for the past 24 hrs:   BP Temp Temp src Pulse Resp SpO2   03/14/24 1027 119/85 98.5  F (36.9  C) Tympanic 85 16 98 %       Vital signs reviewed by: Jareth Grier PA-C    Physical Exam   Constitutional: Alert and active. Non-toxic appearing.  No acute distress.  HENT:   Right Ear: External ear normal. TM: normal  Left Ear: External ear normal. TM: normal  Nose: Nose normal.    Eyes: Conjunctivae, EOM and lids are normal.   Mouth: Mucous membranes are moist. Posterior oropharynx is slightly erythematous. No exudates. Normal tongue and tonsil. Uvula is midline.. No submandibular swelling or erythema.  Neck: Normal ROM. No meningismus  Cardiovascular: Regular rate and rhythm  Pulmonary/Chest: Effort normal. No respiratory distress. Lungs are clear to auscultation throughout.  Neurological: Alert and oriented x3.  Skin: No rash noted on visualized skin.       Labs/Imaging:  Results for orders placed or performed in visit on 03/14/24   Streptococcus A Rapid Screen w/Reflex to PCR - Clinic Collect     Status: Normal    Specimen: Throat; Swab   Result Value Ref Range    Group A Strep antigen Negative Negative         Jareth Grier PA-C, March 14, 2024

## 2024-03-16 DIAGNOSIS — R51.9 CHRONIC NONINTRACTABLE HEADACHE, UNSPECIFIED HEADACHE TYPE: ICD-10-CM

## 2024-03-16 DIAGNOSIS — G89.29 CHRONIC NONINTRACTABLE HEADACHE, UNSPECIFIED HEADACHE TYPE: ICD-10-CM

## 2024-03-18 RX ORDER — TOPIRAMATE 25 MG/1
50 TABLET, FILM COATED ORAL AT BEDTIME
Qty: 60 TABLET | Refills: 1 | Status: SHIPPED | OUTPATIENT
Start: 2024-03-18 | End: 2024-04-12

## 2024-03-20 ENCOUNTER — OFFICE VISIT (OUTPATIENT)
Dept: FAMILY MEDICINE | Facility: CLINIC | Age: 39
End: 2024-03-20

## 2024-03-20 VITALS
OXYGEN SATURATION: 97 % | DIASTOLIC BLOOD PRESSURE: 74 MMHG | HEART RATE: 82 BPM | RESPIRATION RATE: 16 BRPM | SYSTOLIC BLOOD PRESSURE: 109 MMHG | BODY MASS INDEX: 27.79 KG/M2 | WEIGHT: 147.2 LBS | TEMPERATURE: 97.9 F | HEIGHT: 61 IN

## 2024-03-20 DIAGNOSIS — R79.89 ELEVATED LFTS: ICD-10-CM

## 2024-03-20 DIAGNOSIS — R51.9 CHRONIC NONINTRACTABLE HEADACHE, UNSPECIFIED HEADACHE TYPE: Primary | ICD-10-CM

## 2024-03-20 DIAGNOSIS — F32.1 MAJOR DEPRESSIVE DISORDER, SINGLE EPISODE, MODERATE (H): ICD-10-CM

## 2024-03-20 DIAGNOSIS — E55.9 VITAMIN D DEFICIENCY: ICD-10-CM

## 2024-03-20 DIAGNOSIS — G89.29 CHRONIC NONINTRACTABLE HEADACHE, UNSPECIFIED HEADACHE TYPE: Primary | ICD-10-CM

## 2024-03-20 DIAGNOSIS — G43.909 MIGRAINE WITHOUT STATUS MIGRAINOSUS, NOT INTRACTABLE, UNSPECIFIED MIGRAINE TYPE: ICD-10-CM

## 2024-03-20 LAB
BASOPHILS # BLD AUTO: 0 10E3/UL (ref 0–0.2)
BASOPHILS NFR BLD AUTO: 0 %
EOSINOPHIL # BLD AUTO: 0.1 10E3/UL (ref 0–0.7)
EOSINOPHIL NFR BLD AUTO: 1 %
ERYTHROCYTE [DISTWIDTH] IN BLOOD BY AUTOMATED COUNT: 12.5 % (ref 10–15)
HCT VFR BLD AUTO: 45.2 % (ref 35–47)
HGB BLD-MCNC: 14.4 G/DL (ref 11.7–15.7)
IMM GRANULOCYTES # BLD: 0.1 10E3/UL
IMM GRANULOCYTES NFR BLD: 1 %
LYMPHOCYTES # BLD AUTO: 3.2 10E3/UL (ref 0.8–5.3)
LYMPHOCYTES NFR BLD AUTO: 32 %
MCH RBC QN AUTO: 31.3 PG (ref 26.5–33)
MCHC RBC AUTO-ENTMCNC: 31.9 G/DL (ref 31.5–36.5)
MCV RBC AUTO: 98 FL (ref 78–100)
MONOCYTES # BLD AUTO: 0.7 10E3/UL (ref 0–1.3)
MONOCYTES NFR BLD AUTO: 7 %
NEUTROPHILS # BLD AUTO: 6.1 10E3/UL (ref 1.6–8.3)
NEUTROPHILS NFR BLD AUTO: 59 %
PLATELET # BLD AUTO: 265 10E3/UL (ref 150–450)
RBC # BLD AUTO: 4.6 10E6/UL (ref 3.8–5.2)
WBC # BLD AUTO: 10.2 10E3/UL (ref 4–11)

## 2024-03-20 PROCEDURE — 80053 COMPREHEN METABOLIC PANEL: CPT | Performed by: PHYSICIAN ASSISTANT

## 2024-03-20 PROCEDURE — 99214 OFFICE O/P EST MOD 30 MIN: CPT | Performed by: PHYSICIAN ASSISTANT

## 2024-03-20 PROCEDURE — 85025 COMPLETE CBC W/AUTO DIFF WBC: CPT | Performed by: PHYSICIAN ASSISTANT

## 2024-03-20 PROCEDURE — 82306 VITAMIN D 25 HYDROXY: CPT | Performed by: PHYSICIAN ASSISTANT

## 2024-03-20 PROCEDURE — 36415 COLL VENOUS BLD VENIPUNCTURE: CPT | Performed by: PHYSICIAN ASSISTANT

## 2024-03-20 ASSESSMENT — PAIN SCALES - GENERAL: PAINLEVEL: MILD PAIN (3)

## 2024-03-20 ASSESSMENT — ENCOUNTER SYMPTOMS: HEADACHES: 1

## 2024-03-20 NOTE — PATIENT INSTRUCTIONS
-Magnesium 400 mg at night + Riboflavin B6 both preventative for migraines  -Continue Topamax 50 mg at bed  -Imitrex as needed at first sign of migraine!   -Option to see neurology in the future

## 2024-03-20 NOTE — PROGRESS NOTES
"  Assessment & Plan     Chronic nonintractable headache, unspecified headache type  Migraine without status migrainosus, not intractable, unspecified migraine type  Continue Topamax at 50 mg nightly.  Discussed Imitrex to be used as needed at the first sign of migraine.  Healthy diet and exercise.  Magnesium + riboflavin 400 mg nightly additionally for prevention.  Discussed in the future if she ever thinks about tapering off topiramate to discuss with me first.  Option to see neurology if persistent symptoms.    - CBC with platelets and differential  - Comprehensive metabolic panel (BMP + Alb, Alk Phos, ALT, AST, Total. Bili, TP)    Major depressive disorder, single episode, moderate (H)  Continue to taper with psychiatry team.    Vitamin D deficiency  Off vitamin D supplementation both prescription and over-the-counter.  Reassess level.  May need additional prescription dose followed by consistent over-the-counter use.  - Vitamin D Deficiency    30 minutes spent by me on the date of the encounter doing chart review, review of test results, interpretation of tests, patient visit, and documentation       BMI  Estimated body mass index is 27.81 kg/m  as calculated from the following:    Height as of this encounter: 1.549 m (5' 1\").    Weight as of this encounter: 66.8 kg (147 lb 3.2 oz).   Weight management plan: Discussed healthy diet and exercise guidelines        Subjective   Tess is a very pleasant 38 year old, presenting for the following health issues:  Headache    Here today for follow-up.  Recently seen by one of my colleagues on 2/19/2024 for migraine headaches.    States she has had headaches her entire life.  Has noted distinct moments over the past few years where these have become more classic migraine headaches with associated nausea, vomiting, dizziness.  Will note some aura/eye changes associated with them.  Pretty persistent over the past month however after seeing one of my colleagues was started on " "topiramate 50 mg nightly in addition to Imitrex as needed which she has not used.  She is tolerating topiramate well.  Describes medication as \"heavy \", however has cut down her migraine headaches from almost daily to every 1.5 weeks in just 1 month.  Denies morning grogginess.  However topiramate is helpful for sleep and she is hopeful about the potential \"side effect \"of weight loss.  Head CT negative which is reassuring.  Tapering down Effexor with the help of her psychiatry team.  Mood significantly improved.    Otherwise feeling well.  Working from home now for [employer].  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    Via the Health Maintenance questionnaire, the patient has reported the following services have been completed -Cervical Cancer Screening, this information has been sent to the abstraction team.  History of Present Illness       Headaches:   Since the patient's last clinic visit, headaches are: no change  The patient is getting headaches:  Weekly  She is able to do normal daily activities when she has a migraine.  The patient is taking the following rescue/relief medications:  Other   Patient states \"The relief is inconsistent\" from the rescue/relief medications.   The patient is taking the following medications to prevent migraines:  Other  In the past 4 weeks, the patient has gone to an Urgent Care or Emergency Room 1 time times due to headaches.    Reason for visit:  Follow up to migraine and general check in    She eats 0-1 servings of fruits and vegetables daily.She consumes 1 sweetened beverage(s) daily.She exercises with enough effort to increase her heart rate 9 or less minutes per day.  She exercises with enough effort to increase her heart rate 3 or less days per week.   She is taking medications regularly.           Review of Systems  Constitutional, neuro, ENT, endocrine, pulmonary, cardiac, gastrointestinal, genitourinary, musculoskeletal, integument and psychiatric systems are negative, " "except as otherwise noted.      Objective    /74 (BP Location: Right arm, Patient Position: Sitting, Cuff Size: Adult Large)   Pulse 82   Temp 97.9  F (36.6  C) (Tympanic)   Resp 16   Ht 1.549 m (5' 1\")   Wt 66.8 kg (147 lb 3.2 oz)   SpO2 97%   BMI 27.81 kg/m    Body mass index is 27.81 kg/m .  Physical Exam   EXAM:  GENERAL APPEARANCE: healthy, alert and no distress  EYES: Eyes grossly normal to inspection, PERRL and conjunctivae and sclerae normal  NECK: no asymmetry, masses, or scars  RESP: lungs clear to auscultation - no rales, rhonchi or wheezes  CV: regular rates and rhythm, normal S1 S2, no S3 or S4 and no murmur, click or rub  MS: extremities normal- no gross deformities noted  SKIN: no suspicious lesions or rashes  NEURO: Normal strength and tone, mentation intact and speech normal  PSYCH: mentation appears normal and affect normal          The likelihood of other entities in the differential is insufficient to justify any further testing for them at this time. This was explained to the patient. The patient was advised that persistent or worsening symptoms would require further evaluation. Patient advised to call the office and if unable to reach to go to the emergency room if they develop any new or worsening symptoms. Expressed understanding and agreement with above stated plan.       Signed Electronically by: Wicho Gray PA-C    "

## 2024-03-21 LAB
ALBUMIN SERPL BCG-MCNC: 4.1 G/DL (ref 3.5–5.2)
ALP SERPL-CCNC: 98 U/L (ref 40–150)
ALT SERPL W P-5'-P-CCNC: 80 U/L (ref 0–50)
ANION GAP SERPL CALCULATED.3IONS-SCNC: 11 MMOL/L (ref 7–15)
AST SERPL W P-5'-P-CCNC: 38 U/L (ref 0–45)
BILIRUB SERPL-MCNC: 0.2 MG/DL
BUN SERPL-MCNC: 12.3 MG/DL (ref 6–20)
CALCIUM SERPL-MCNC: 8.8 MG/DL (ref 8.6–10)
CHLORIDE SERPL-SCNC: 106 MMOL/L (ref 98–107)
CREAT SERPL-MCNC: 0.75 MG/DL (ref 0.51–0.95)
DEPRECATED HCO3 PLAS-SCNC: 22 MMOL/L (ref 22–29)
EGFRCR SERPLBLD CKD-EPI 2021: >90 ML/MIN/1.73M2
GLUCOSE SERPL-MCNC: 88 MG/DL (ref 70–99)
POTASSIUM SERPL-SCNC: 4.3 MMOL/L (ref 3.4–5.3)
PROT SERPL-MCNC: 7.4 G/DL (ref 6.4–8.3)
SODIUM SERPL-SCNC: 139 MMOL/L (ref 135–145)
VIT D+METAB SERPL-MCNC: 17 NG/ML (ref 20–50)

## 2024-03-22 NOTE — RESULT ENCOUNTER NOTE
Mansoor Pulido,    -Stable blood counts. No signs of anemia.     -Vitamin D is on the low.  I would recommend supplementation every day over-the-counter with 50 mcg or 2,000 international unit(s).  Can repeat in the future.    -Normal electrolytes and kidney function.  However one of your liver function test is slightly elevated.  I doubt this is related to topiramate.  Let's repeat in 2-3 months.  Be mindful of Tylenol/alcohol use.    I will place the orders and you can schedule lab only appointment in 2 to 3 months.    Let me know if you have any questions or concerns,     SHERINE Bucio [facility], [place]

## 2024-04-01 ENCOUNTER — MYC MEDICAL ADVICE (OUTPATIENT)
Dept: PSYCHIATRY | Facility: CLINIC | Age: 39
End: 2024-04-01

## 2024-04-01 DIAGNOSIS — F32.5 MAJOR DEPRESSIVE DISORDER WITH SINGLE EPISODE, IN FULL REMISSION (H): ICD-10-CM

## 2024-04-02 RX ORDER — VENLAFAXINE HYDROCHLORIDE 75 MG/1
75 CAPSULE, EXTENDED RELEASE ORAL DAILY
Qty: 16 CAPSULE | Refills: 0 | Status: SHIPPED | OUTPATIENT
Start: 2024-04-02 | End: 2024-04-18

## 2024-04-12 DIAGNOSIS — R51.9 CHRONIC NONINTRACTABLE HEADACHE, UNSPECIFIED HEADACHE TYPE: ICD-10-CM

## 2024-04-12 DIAGNOSIS — G89.29 CHRONIC NONINTRACTABLE HEADACHE, UNSPECIFIED HEADACHE TYPE: ICD-10-CM

## 2024-04-12 RX ORDER — TOPIRAMATE 25 MG/1
50 TABLET, FILM COATED ORAL AT BEDTIME
Qty: 180 TABLET | Refills: 1 | Status: SHIPPED | OUTPATIENT
Start: 2024-04-12
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
## 2024-04-18 ENCOUNTER — OFFICE VISIT (OUTPATIENT)
Dept: PSYCHIATRY | Facility: CLINIC | Age: 39
End: 2024-04-18
Attending: PSYCHIATRY & NEUROLOGY
Payer: COMMERCIAL

## 2024-04-18 VITALS
BODY MASS INDEX: 27.78 KG/M2 | HEART RATE: 90 BPM | SYSTOLIC BLOOD PRESSURE: 110 MMHG | WEIGHT: 147 LBS | DIASTOLIC BLOOD PRESSURE: 80 MMHG

## 2024-04-18 DIAGNOSIS — E55.9 VITAMIN D DEFICIENCY: ICD-10-CM

## 2024-04-18 DIAGNOSIS — F32.1 MAJOR DEPRESSIVE DISORDER, SINGLE EPISODE, MODERATE (H): Primary | ICD-10-CM

## 2024-04-18 PROCEDURE — 99214 OFFICE O/P EST MOD 30 MIN: CPT | Mod: HN | Performed by: STUDENT IN AN ORGANIZED HEALTH CARE EDUCATION/TRAINING PROGRAM

## 2024-04-18 PROCEDURE — 90833 PSYTX W PT W E/M 30 MIN: CPT | Mod: HN | Performed by: STUDENT IN AN ORGANIZED HEALTH CARE EDUCATION/TRAINING PROGRAM

## 2024-04-18 PROCEDURE — 99213 OFFICE O/P EST LOW 20 MIN: CPT | Performed by: STUDENT IN AN ORGANIZED HEALTH CARE EDUCATION/TRAINING PROGRAM

## 2024-04-18 ASSESSMENT — PAIN SCALES - GENERAL: PAINLEVEL: MILD PAIN (2)

## 2024-04-18 ASSESSMENT — PATIENT HEALTH QUESTIONNAIRE - PHQ9
SUM OF ALL RESPONSES TO PHQ QUESTIONS 1-9: 9
10. IF YOU CHECKED OFF ANY PROBLEMS, HOW DIFFICULT HAVE THESE PROBLEMS MADE IT FOR YOU TO DO YOUR WORK, TAKE CARE OF THINGS AT HOME, OR GET ALONG WITH OTHER PEOPLE: SOMEWHAT DIFFICULT
SUM OF ALL RESPONSES TO PHQ QUESTIONS 1-9: 9

## 2024-04-18 NOTE — PATIENT INSTRUCTIONS
Thank you for your visit today.     Treatment Plan Today:     1) Plan: No changes, not currently on any meds    2) Follow-up appointment if needed.     3) In the case of an emergency, crisis numbers are below and clinic after hours number is [phone].    Vale Suero MD  Psychiatry Resident Physician     **For crisis resources, please see the information at the end of this document**   Patient Education    Thank you for coming to the [facility] [place] [facility].     Lab Testing:  If you had lab testing today and your results are reassuring or normal they will be mailed to you or sent through [employer] within 7 days. If the lab tests need quick action we will call you with the results. The phone number we will call with results is # [phone]. If this is not the best number please call our clinic and change the number.     Medication Refills:  If you need any refills please call your pharmacy and they will contact us. Our fax number for refills is [phone].   Three business days of notice are needed for general medication refill requests.   Five business days of notice are needed for controlled substance refill requests.   If you need to change to a different pharmacy, please contact the new pharmacy directly. The new pharmacy will help you get your medications transferred.     Contact Us:  Please call [phone] during business hours (8-5:00 M-F).   If you have medication related questions after clinic hours, or on the weekend, please call [phone].     Financial Assistance [phone]   Medical Records [phone]       MENTAL HEALTH CRISIS RESOURCES:  For a emergency help, please call 911 or go to the nearest Emergency Department.     Emergency Walk-In Options:   Em[facility] Unit @ [facility] ([place]): [phone] - Specialized mental health emergency area designed to be calming  [facility] ([place]): [phone]  [facility] ([place]): [phone]  [facility] ([place]): [phone]    [facility] Crisis Information:   Brian: [phone]  Melvin: [phone]  Suzie (DAVID) - Adult: [phone]     Child: [phone]  Jose Miguel - Adult: [phone]     Child: [phone]  [place]: [phone]  List of all [facility] resources:   [email or web address]://[email or web address]/dhs/people-[email or web address]-serve/adults/health-care/mental-health/resources/crisis-contacts.jsp    National Crisis Information:   Crisis Text Line: Text  [place]  to 608755  Suicide & Crisis Lifeline: 988  National Suicide Prevention Lifeline: 7-164-160-TALK (1-939.988.8788)       For online chat options, visit https://suicidepreventionlifeline.org/chat/  Poison Control Center: 1-746.513.1902  Trans Lifeline: 1-247.601.6224 - Hotline for transgender people of all ages  The [employer]: 3-746-894-6938 - Hotline for LGBT youth     For Non-Emergency Support:   Fast Tracker: Mental Health & Substance Use Disorder Resources -   https://www.[facility].itn.org/

## 2024-04-18 NOTE — NURSING NOTE
"Chief Complaint   Patient presents with    Recheck Medication     Major depressive disorder with single episode, in full remission     Stopped all her medications. Reported \" hang over headaches \" . Was seen by another provider for headaches .  She stopped the Effexor a week ago.     Rosanne Tarango on 4/18/2024 at 9:41 AM   "

## 2024-05-11 ENCOUNTER — HEALTH MAINTENANCE LETTER (OUTPATIENT)
Age: 39
End: 2024-05-11

## 2024-05-22 ENCOUNTER — LAB (OUTPATIENT)
Dept: LAB | Facility: CLINIC | Age: 39
End: 2024-05-22
Attending: PHYSICIAN ASSISTANT
Payer: COMMERCIAL

## 2024-05-22 DIAGNOSIS — E55.9 VITAMIN D DEFICIENCY: ICD-10-CM

## 2024-05-22 DIAGNOSIS — R79.89 ELEVATED LFTS: ICD-10-CM

## 2024-05-22 LAB
ALBUMIN SERPL BCG-MCNC: 3.8 G/DL (ref 3.5–5.2)
ALP SERPL-CCNC: 81 U/L (ref 40–150)
ALT SERPL W P-5'-P-CCNC: 39 U/L (ref 0–50)
AST SERPL W P-5'-P-CCNC: 26 U/L (ref 0–45)
BILIRUB DIRECT SERPL-MCNC: <0.2 MG/DL (ref 0–0.3)
BILIRUB SERPL-MCNC: 0.3 MG/DL
PROT SERPL-MCNC: 6.9 G/DL (ref 6.4–8.3)
VIT D+METAB SERPL-MCNC: 16 NG/ML (ref 20–50)

## 2024-05-22 PROCEDURE — 80076 HEPATIC FUNCTION PANEL: CPT

## 2024-05-22 PROCEDURE — 82306 VITAMIN D 25 HYDROXY: CPT

## 2024-05-22 PROCEDURE — 36415 COLL VENOUS BLD VENIPUNCTURE: CPT

## 2024-05-23 RX ORDER — ERGOCALCIFEROL 1.25 MG/1
50000 CAPSULE, LIQUID FILLED ORAL WEEKLY
Qty: 8 CAPSULE | Refills: 0 | Status: SHIPPED | OUTPATIENT
Start: 2024-05-23

## 2024-05-23 NOTE — RESULT ENCOUNTER NOTE
Mansoor Pulido,     Hope all is well!      Vitamin D continues to be low.  Let's do the 50,000 unit once weekly dosing for 8 weeks and after completing that transition to 2000 IUs daily over-the-counter.  I will send prescription to your pharmacy.  Lets repeat the vitamin D and 3 to 4 months.    Liver test back to normal range as well!     Let me know if you have any questions or concerns,     SHERINE Bucio [facility]

## 2024-07-20 ENCOUNTER — HEALTH MAINTENANCE LETTER (OUTPATIENT)
Age: 39
End: 2024-07-20

## 2024-09-25 ENCOUNTER — PATIENT OUTREACH (OUTPATIENT)
Dept: CARE COORDINATION | Facility: CLINIC | Age: 39
End: 2024-09-25

## 2025-02-04 ENCOUNTER — TRANSFERRED RECORDS (OUTPATIENT)
Dept: HEALTH INFORMATION MANAGEMENT | Facility: CLINIC | Age: 40
End: 2025-02-04

## 2025-04-15 ENCOUNTER — TRANSFERRED RECORDS (OUTPATIENT)
Dept: HEALTH INFORMATION MANAGEMENT | Facility: CLINIC | Age: 40
End: 2025-04-15

## 2025-05-17 ENCOUNTER — HEALTH MAINTENANCE LETTER (OUTPATIENT)
Age: 40
End: 2025-05-17